# Patient Record
Sex: FEMALE | Race: WHITE | Employment: OTHER | URBAN - METROPOLITAN AREA
[De-identification: names, ages, dates, MRNs, and addresses within clinical notes are randomized per-mention and may not be internally consistent; named-entity substitution may affect disease eponyms.]

---

## 2018-09-21 ENCOUNTER — HOSPITAL ENCOUNTER (EMERGENCY)
Age: 48
Discharge: HOME | End: 2018-09-21
Attending: EMERGENCY MEDICINE
Payer: COMMERCIAL

## 2018-09-21 VITALS
TEMPERATURE: 98.1 F | SYSTOLIC BLOOD PRESSURE: 151 MMHG | HEART RATE: 84 BPM | WEIGHT: 148 LBS | BODY MASS INDEX: 29.06 KG/M2 | HEIGHT: 60 IN | OXYGEN SATURATION: 95 % | DIASTOLIC BLOOD PRESSURE: 77 MMHG | RESPIRATION RATE: 16 BRPM

## 2018-09-21 DIAGNOSIS — R53.82 CHRONIC FATIGUE: ICD-10-CM

## 2018-09-21 DIAGNOSIS — R53.1 GENERAL WEAKNESS: Primary | ICD-10-CM

## 2018-09-21 LAB
ANION GAP SERPL CALCULATED.3IONS-SCNC: 13 MMOL/L (ref 7–16)
BASOPHILS ABSOLUTE: 0.07 E9/L (ref 0–0.2)
BASOPHILS RELATIVE PERCENT: 1 % (ref 0–2)
BILIRUBIN URINE: NEGATIVE
BLOOD, URINE: NEGATIVE
BUN BLDV-MCNC: 15 MG/DL (ref 6–20)
CALCIUM SERPL-MCNC: 10 MG/DL (ref 8.6–10.2)
CHLORIDE BLD-SCNC: 102 MMOL/L (ref 98–107)
CLARITY: CLEAR
CO2: 28 MMOL/L (ref 22–29)
COLOR: YELLOW
CREAT SERPL-MCNC: 0.7 MG/DL (ref 0.5–1)
EOSINOPHILS ABSOLUTE: 0.07 E9/L (ref 0.05–0.5)
EOSINOPHILS RELATIVE PERCENT: 1 % (ref 0–6)
GFR AFRICAN AMERICAN: >60
GFR NON-AFRICAN AMERICAN: >60 ML/MIN/1.73
GLUCOSE BLD-MCNC: 92 MG/DL (ref 74–109)
GLUCOSE URINE: NEGATIVE MG/DL
HCT VFR BLD CALC: 42.3 % (ref 34–48)
HEMOGLOBIN: 13.8 G/DL (ref 11.5–15.5)
KETONES, URINE: NEGATIVE MG/DL
LEUKOCYTE ESTERASE, URINE: NEGATIVE
LYMPHOCYTES ABSOLUTE: 0.29 E9/L (ref 1.5–4)
LYMPHOCYTES RELATIVE PERCENT: 4 % (ref 20–42)
MAGNESIUM: 2.1 MG/DL (ref 1.6–2.6)
MCH RBC QN AUTO: 30.4 PG (ref 26–35)
MCHC RBC AUTO-ENTMCNC: 32.6 % (ref 32–34.5)
MCV RBC AUTO: 93.2 FL (ref 80–99.9)
METAMYELOCYTES RELATIVE PERCENT: 1 % (ref 0–1)
MONOCYTES ABSOLUTE: 0.65 E9/L (ref 0.1–0.95)
MONOCYTES RELATIVE PERCENT: 9 % (ref 2–12)
MYELOCYTE PERCENT: 1 % (ref 0–0)
NEUTROPHILS ABSOLUTE: 6.12 E9/L (ref 1.8–7.3)
NEUTROPHILS RELATIVE PERCENT: 83 % (ref 43–80)
NITRITE, URINE: NEGATIVE
PDW BLD-RTO: 13.6 FL (ref 11.5–15)
PH UA: 5.5 (ref 5–9)
PLATELET # BLD: 244 E9/L (ref 130–450)
PMV BLD AUTO: 11.7 FL (ref 7–12)
POTASSIUM SERPL-SCNC: 3.9 MMOL/L (ref 3.5–5)
PROTEIN UA: NEGATIVE MG/DL
RBC # BLD: 4.54 E12/L (ref 3.5–5.5)
RBC # BLD: NORMAL 10*6/UL
SODIUM BLD-SCNC: 143 MMOL/L (ref 132–146)
SPECIFIC GRAVITY UA: >=1.03 (ref 1–1.03)
UROBILINOGEN, URINE: 1 E.U./DL
WBC # BLD: 7.2 E9/L (ref 4.5–11.5)

## 2018-09-21 PROCEDURE — 81003 URINALYSIS AUTO W/O SCOPE: CPT

## 2018-09-21 PROCEDURE — 97161 PT EVAL LOW COMPLEX 20 MIN: CPT

## 2018-09-21 PROCEDURE — 85025 COMPLETE CBC W/AUTO DIFF WBC: CPT

## 2018-09-21 PROCEDURE — 99284 EMERGENCY DEPT VISIT MOD MDM: CPT

## 2018-09-21 PROCEDURE — 87088 URINE BACTERIA CULTURE: CPT

## 2018-09-21 PROCEDURE — G8987 SELF CARE CURRENT STATUS: HCPCS

## 2018-09-21 PROCEDURE — G8988 SELF CARE GOAL STATUS: HCPCS

## 2018-09-21 PROCEDURE — 80048 BASIC METABOLIC PNL TOTAL CA: CPT

## 2018-09-21 PROCEDURE — 36415 COLL VENOUS BLD VENIPUNCTURE: CPT

## 2018-09-21 PROCEDURE — 83735 ASSAY OF MAGNESIUM: CPT

## 2018-09-21 PROCEDURE — 97165 OT EVAL LOW COMPLEX 30 MIN: CPT

## 2018-09-21 RX ORDER — PAROXETINE HYDROCHLORIDE 20 MG/1
20 TABLET, FILM COATED ORAL EVERY MORNING
COMMUNITY
End: 2021-05-05

## 2018-09-21 RX ORDER — CHOLECALCIFEROL (VITAMIN D3) 1250 MCG
1 CAPSULE ORAL WEEKLY
COMMUNITY

## 2018-09-21 NOTE — PROGRESS NOTES
apple risk of falls. Patient education  Pt educated on PT objectives    Patient response to education:   Pt verbalized understanding Pt demonstrated skill Pt requires further education in this area   yes         Rehab potential is good for reaching above PT goals. Pts/ family goals   1. To improve function    Patient and or family understand(s) diagnosis, prognosis, and plan of care. PLAN  PT care will be provided in accordance with the objectives noted above. Whenever appropriate, clear delegation orders will be provided for nursing staff. Exercises and functional mobility practice will be used as well as appropriate assistive devices or modalities to obtain goals. Patient and family education will also be administered as needed. Frequency of treatments will be 2-5x/week x 5 days.     Ev PT  [id number]

## 2018-09-21 NOTE — CARE COORDINATION
Social Work/Transition of Care:    Pt is a 50 y/o  mother of two adult children who was diagnosed with Multiple Sclerosis in 1996. Pt is well known to this worker, she was able to maintain independent living until last year. they  Pt was living in a [address] style home until a month ago when she called her family for help. Pt has been living for the past month with her parents sleeping on a couch in the living room due to being unable to safely go up steps. Pt parents are elderly and due to their housing set up living with them at this time is not a option  Pt would not allow any family members into her home and was relying on her 2 teenage children at that time 16 and 23 to take care of her and the home, Pt parents were last inside of her home 3/2017 after pt returned home from [facility] due to receiving a Baclofen Pump, at that time the home was completely cleaned and had no issues. Since pt refused to let any family members in until she called for help last month. Pt is unable to return to the home it is in deplorable and needs extensive work, pt stated she was unable to bath due to having no water, pt did not have a functioning toilet a [employer] bag was placed on the toilet in order to discard feces, once full it was thrown in a various places in the house. Pt also had a leak inside the house in which the water that was inside caused her basement ceiling to fall in, pt also reported her furnace/air conditioner did not work. Since pt did not have central air in the house mold has grown and spread due to the water. Pt family has rented a roll off and hired someone to discard pt belongings. Pt tried to go [facility] but once she arrived started to have tightness in her legs became frustrated and wanted to go home. Pt is in need of 24 hr care at this time for safety and stabilization.     Electronically signed by Jonathan Cervantes on 0/09/0042 at 2:43 PM

## 2018-09-21 NOTE — PROGRESS NOTES
assistance   Functional Transfers/Mobility: Independent (with use of walker) - patient reported falling recently  Family/Caregiver Assistance Available: Per review of patient's medical record, patient's parents are unable to provide extensive assistance with ADLs/IADLs. Equipment (AE/DME/AD) Owned: walker; tub seat    Pain Level: Patient denied experiencing pain. Hearing: WFL  Vision: WFL      Cognition: Patient alert and oriented grossly. Problem Solving: Fair+  Safety Awareness: Fair+    UE Strength/ROM:   Strength: ROM:  Comments:   R UE:  4-/5 grossly WFL    L UE: 4-/5 grossly WFL       Functional Assessment:   Initial Status:  9/21/2018  Comments:   Feeding: Setup    Grooming: Min A (seated)   Upper Body Dressing: Min A     Lower Body Dressing: Max A    Bathing: Max A    Toileting: Max A    Bed Mobility: Supine-to-Sit: Min A  Sit-to-Supine: Max A (on cart in ER)   Functional Transfers: Sit-to-Stand: Min Ax2 from edge of ER cart (elevated surface) Cues given to maximize safety with functional transfers. Functional Mobility: Mod Ax1 + Min Ax1 for functional mobility short distance with ER hallway Cues needed to maximize safety with use of walker.      Sitting Balance: Fair+ (seated at edge of ER cart)  Standing Balance: Poor+ (with walker)  Endurance/Activity Tolerance: Limited  Sensation: WFL  Edema: No                         Assessment of Current Deficits:   Functional Mobility [x]  ROM [] Strength [x]  Cognition []  ADLs [x]   IADLs [x] Safety Awareness [x] Endurance [x]  Fine Motor Coordination [] Balance [x] Vision/Perception [] Sensation []   Gross Motor Coordination []    Occupations Limited By Various Client Factors and Performance Skills:  ADLs - bathing, dressing, toileting, grooming, self-feeding X   IADLs X   Rest and Sleep    Education    Work    Play    Leisure    Social Participation      OT Evaluation Complexity Level: Low  This level of OT evaluation was determined based upon the of the within 2 weeks. 5. Patient will perform all aspects of toileting with modified independence within 2 weeks. 6. Patient will perform functional transfers with modified independence within 2 weeks in order to maximize independence with ADLs. 7. Patient will perform functional mobility, using AD as needed, with modified independence within 2 weeks in order to maximize independence with ADLs. 8. Patient will demonstrate Good understanding and consistent implementation of energy conservation techniques and work simplification techniques into ADL routines. Patient and/or family understands diagnosis, prognosis and OT plan of care: Yes    Patient Education: Throughout this session, patient education was provided regarding proper hand placement during functional transfers, techniques to maximize safety, importance of having assistance with functional transfers/mobility during hospitalization, call light use, and OT plan of care; patient verbalized understanding. Comments: Upon this OTR's arrival to patient's room, patient supine on cart in ER hallway. Upon conclusion of this session, patient supine on cart in ER hallway with  present. During the start and prior to conclusion of this session, environmental modifications (including line management, as appropriate) were completed for patient's safety and efficiency of treatment session. Time Out: 1:31pm    EULALIO Aldrich/ERICK  License Number: [id number]

## 2018-09-21 NOTE — ED NOTES
Report called to [facility] and [facility]; spoke with Waldo Keller, SANCHEZ  09/21/18 5967

## 2018-09-21 NOTE — ED PROVIDER NOTES
HPI:  9/21/18,   Time: 12:23 PM         Damon Asencio is a 51 y.o. female presenting to the ED for Fatigue and extremity weakness. , beginning 1 1/2 weeks  ago. The complaint has been persistent, moderate in severity, and worsened by nothing. Patient reports a history of MS. States symptoms are gradually getting worse over the last week and a half. She did see her doctor yesterday who increased her baclofen pump. She presents today for evaluation for possible long-term rehab therapy. She denies any fevers or chills nausea vomiting. She has no chest pain or shortness of breath. ROS:   Pertinent positives and negatives are stated within HPI, all other systems reviewed and are negative.  --------------------------------------------- PAST HISTORY ---------------------------------------------  Past Medical History:  has a past medical history of Multiple sclerosis ([facility] [address] Past Surgical History:  has a past surgical history that includes baclofen pump implantation (01/2017). Social History:  reports that she has never smoked. She has never used smokeless tobacco. She reports that she does not drink alcohol or use drugs. Family History: family history is not on file. The patients home medications have been reviewed. Allergies: Patient has no known allergies.     -------------------------------------------------- RESULTS -------------------------------------------------  All laboratory and radiology results have been personally reviewed by myself   LABS:  Results for orders placed or performed during the hospital encounter of 09/21/18   CBC Auto Differential   Result Value Ref Range    WBC 7.2 4.5 - 11.5 E9/L    RBC 4.54 3.50 - 5.50 E12/L    Hemoglobin 13.8 11.5 - 15.5 g/dL    Hematocrit 42.3 34.0 - 48.0 %    MCV 93.2 80.0 - 99.9 fL    MCH 30.4 26.0 - 35.0 pg    MCHC 32.6 32.0 - 34.5 %    RDW 13.6 11.5 - 15.0 fL    Platelets 040 774 - 887 E9/L    MPV 11.7 7.0 - 12.0 fL    Neutrophils % 83.0 (H) 43.0 - EXAM--------------------------------------      Constitutional/General: Alert and oriented x3, well appearing, non toxic in NAD  Head: NC/AT  Eyes: PERRL, EOMI  Mouth: Oropharynx clear, handling secretions, no trismus  Neck: Supple, full ROM, no meningeal signs  Pulmonary: Lungs clear to auscultation bilaterally, no wheezes, rales, or rhonchi. Not in respiratory distress  Cardiovascular:  Regular rate and rhythm, no murmurs, gallops, or rubs. 2+ distal pulses  Abdomen: Soft, non tender, non distended,   Extremities: Moves all extremities x 4. Warm and well perfused, 4/5 muscle strength in the RLE. Skin: warm and dry without rash  Neurologic: GCS 15, no focal deficits. Decrease muscle strength in the RLE. Psych: Normal Affect      ------------------------------ ED COURSE/MEDICAL DECISION MAKING----------------------  Medications - No data to display      Medical Decision Making:    Labs urine magnesium all reviewed. Patient had PT and OT consultation in the ED for placement. Counseling: The emergency provider has spoken with the patient and discussed todays results, in addition to providing specific details for the plan of care and counseling regarding the diagnosis and prognosis. Questions are answered at this time and they are agreeable with the plan.      --------------------------------- IMPRESSION AND DISPOSITION ---------------------------------    IMPRESSION  1. General weakness    2.  Chronic fatigue        DISPOSITION  Disposition: Discharge to Home  Patient condition is stable                Sheela Setting, DO  09/21/18 5885

## 2018-09-23 LAB — URINE CULTURE, ROUTINE: NORMAL

## 2019-03-30 ENCOUNTER — APPOINTMENT (OUTPATIENT)
Dept: GENERAL RADIOLOGY | Age: 49
End: 2019-03-30
Payer: MEDICAID

## 2019-03-30 ENCOUNTER — HOSPITAL ENCOUNTER (EMERGENCY)
Age: 49
Discharge: HOME | End: 2019-03-30
Attending: EMERGENCY MEDICINE
Payer: MEDICAID

## 2019-03-30 VITALS
HEART RATE: 87 BPM | RESPIRATION RATE: 14 BRPM | SYSTOLIC BLOOD PRESSURE: 135 MMHG | DIASTOLIC BLOOD PRESSURE: 76 MMHG | WEIGHT: 148 LBS | HEIGHT: 59 IN | BODY MASS INDEX: 29.84 KG/M2 | OXYGEN SATURATION: 98 % | TEMPERATURE: 98.2 F

## 2019-03-30 DIAGNOSIS — F41.1 ANXIETY STATE: ICD-10-CM

## 2019-03-30 DIAGNOSIS — F41.1 ANXIETY STATE: Primary | ICD-10-CM

## 2019-03-30 LAB
ANION GAP SERPL CALCULATED.3IONS-SCNC: 12 MMOL/L (ref 7–16)
APTT: 30.8 SEC (ref 24.5–35.1)
BACTERIA: ABNORMAL /HPF
BASOPHILS ABSOLUTE: 0.07 E9/L (ref 0–0.2)
BASOPHILS RELATIVE PERCENT: 0.5 % (ref 0–2)
BILIRUBIN URINE: NEGATIVE
BLOOD, URINE: ABNORMAL
BUN BLDV-MCNC: 12 MG/DL (ref 6–20)
CALCIUM SERPL-MCNC: 9.9 MG/DL (ref 8.6–10.2)
CHLORIDE BLD-SCNC: 104 MMOL/L (ref 98–107)
CLARITY: CLEAR
CO2: 30 MMOL/L (ref 22–29)
COLOR: YELLOW
CREAT SERPL-MCNC: 0.7 MG/DL (ref 0.5–1)
EOSINOPHILS ABSOLUTE: 0.35 E9/L (ref 0.05–0.5)
EOSINOPHILS RELATIVE PERCENT: 2.6 % (ref 0–6)
GFR AFRICAN AMERICAN: >60
GFR NON-AFRICAN AMERICAN: >60 ML/MIN/1.73
GLUCOSE BLD-MCNC: 114 MG/DL (ref 74–99)
GLUCOSE URINE: NEGATIVE MG/DL
HCT VFR BLD CALC: 40.5 % (ref 34–48)
HEMOGLOBIN: 13.4 G/DL (ref 11.5–15.5)
IMMATURE GRANULOCYTES #: 0.08 E9/L
IMMATURE GRANULOCYTES %: 0.6 % (ref 0–5)
INR BLD: 1.1
KETONES, URINE: NEGATIVE MG/DL
LACTIC ACID: 1.7 MMOL/L (ref 0.5–2.2)
LEUKOCYTE ESTERASE, URINE: ABNORMAL
LYMPHOCYTES ABSOLUTE: 1.15 E9/L (ref 1.5–4)
LYMPHOCYTES RELATIVE PERCENT: 8.6 % (ref 20–42)
MCH RBC QN AUTO: 30.6 PG (ref 26–35)
MCHC RBC AUTO-ENTMCNC: 33.1 % (ref 32–34.5)
MCV RBC AUTO: 92.5 FL (ref 80–99.9)
MONOCYTES ABSOLUTE: 1.22 E9/L (ref 0.1–0.95)
MONOCYTES RELATIVE PERCENT: 9.1 % (ref 2–12)
NEUTROPHILS ABSOLUTE: 10.57 E9/L (ref 1.8–7.3)
NEUTROPHILS RELATIVE PERCENT: 78.6 % (ref 43–80)
NITRITE, URINE: NEGATIVE
PDW BLD-RTO: 13 FL (ref 11.5–15)
PH UA: 6 (ref 5–9)
PLATELET # BLD: 314 E9/L (ref 130–450)
PMV BLD AUTO: 11.3 FL (ref 7–12)
POTASSIUM REFLEX MAGNESIUM: 4.4 MMOL/L (ref 3.5–5)
PRO-BNP: 9 PG/ML (ref 0–125)
PROTEIN UA: NEGATIVE MG/DL
PROTHROMBIN TIME: 12.2 SEC (ref 9.3–12.4)
RBC # BLD: 4.38 E12/L (ref 3.5–5.5)
RBC UA: ABNORMAL /HPF (ref 0–2)
SODIUM BLD-SCNC: 146 MMOL/L (ref 132–146)
SPECIFIC GRAVITY UA: 1.01 (ref 1–1.03)
TROPONIN: <0.01 NG/ML (ref 0–0.03)
UROBILINOGEN, URINE: 0.2 E.U./DL
WBC # BLD: 13.4 E9/L (ref 4.5–11.5)
WBC UA: ABNORMAL /HPF (ref 0–5)

## 2019-03-30 PROCEDURE — 80048 BASIC METABOLIC PNL TOTAL CA: CPT

## 2019-03-30 PROCEDURE — 83605 ASSAY OF LACTIC ACID: CPT

## 2019-03-30 PROCEDURE — 85610 PROTHROMBIN TIME: CPT

## 2019-03-30 PROCEDURE — 71045 X-RAY EXAM CHEST 1 VIEW: CPT

## 2019-03-30 PROCEDURE — 85025 COMPLETE CBC W/AUTO DIFF WBC: CPT

## 2019-03-30 PROCEDURE — 84484 ASSAY OF TROPONIN QUANT: CPT

## 2019-03-30 PROCEDURE — 85730 THROMBOPLASTIN TIME PARTIAL: CPT

## 2019-03-30 PROCEDURE — 6370000000 HC RX 637 (ALT 250 FOR IP): Performed by: EMERGENCY MEDICINE

## 2019-03-30 PROCEDURE — 99285 EMERGENCY DEPT VISIT HI MDM: CPT

## 2019-03-30 PROCEDURE — 85378 FIBRIN DEGRADE SEMIQUANT: CPT

## 2019-03-30 PROCEDURE — 81001 URINALYSIS AUTO W/SCOPE: CPT

## 2019-03-30 PROCEDURE — 83880 ASSAY OF NATRIURETIC PEPTIDE: CPT

## 2019-03-30 RX ORDER — DEXTROMETHORPHAN POLISTIREX 30 MG/5ML
30 SUSPENSION ORAL 2 TIMES DAILY PRN
COMMUNITY

## 2019-03-30 RX ORDER — LORATADINE 10 MG/1
10 TABLET ORAL DAILY
COMMUNITY
End: 2021-11-05

## 2019-03-30 RX ORDER — CYANOCOBALAMIN 1000 UG/ML
1000 INJECTION INTRAMUSCULAR; SUBCUTANEOUS
COMMUNITY
End: 2021-05-05

## 2019-03-30 RX ORDER — BISACODYL 10 MG
10 SUPPOSITORY, RECTAL RECTAL DAILY PRN
COMMUNITY

## 2019-03-30 RX ORDER — LORAZEPAM 1 MG/1
2 TABLET ORAL ONCE
Status: COMPLETED | OUTPATIENT
Start: 2019-03-30 | End: 2019-03-30

## 2019-03-30 RX ORDER — LORAZEPAM 1 MG/1
1 TABLET ORAL NIGHTLY PRN
Status: DISCONTINUED | OUTPATIENT
Start: 2019-03-31 | End: 2019-03-31 | Stop reason: HOSPADM

## 2019-03-30 RX ORDER — ACETAMINOPHEN 500 MG
1000 TABLET ORAL EVERY 6 HOURS PRN
COMMUNITY

## 2019-03-30 RX ORDER — LOPERAMIDE HYDROCHLORIDE 2 MG/1
2 CAPSULE ORAL 4 TIMES DAILY PRN
COMMUNITY

## 2019-03-30 RX ORDER — ALUMINA, MAGNESIA, AND SIMETHICONE 2400; 2400; 240 MG/30ML; MG/30ML; MG/30ML
30 SUSPENSION ORAL EVERY 6 HOURS PRN
COMMUNITY

## 2019-03-30 RX ADMIN — LORAZEPAM 2 MG: 1 TABLET ORAL at 23:25

## 2019-03-31 LAB — D DIMER: <200 NG/ML DDU

## 2019-03-31 NOTE — ED PROVIDER NOTES
reported. Neurologic: No weakness numbness or gait abnormalities. Psychiatric; No hallucination, delusion homicidal or suicidal thoughts. Physical Exam:    General:  No acute distress noted. Patient is well nourished and well developed. Vital signs and nurses assesement reviewed. Head: Normocephalic, atraumatic, oral exam showed no abnormalities. Eyes: PERRLA, EOMI, No scleral icterus or papliedema. Neck: No thyroid masses, carotid bruits, tracheal deviation or significant adenopathy. Lungs: Clear to auscultation bilaterally. No rales, rhonchi or pleural rubs. Heart: Normal PMI, No gallop, murmurs, rubs or abnormal heart sounds. Abdomen: Soft without tenderness rigidity, rebound tenderness, organ enlargement or masses. Normal bowel sounds activity. Extremities: No dependent edema, cyanosis, calf tenderness or deformities. Stephanie's sign negative bilaterally. Peripheral pulses were normal and symmetrical.    Neurologic: Awake and alert, with normal speech and comprehension. Cranial nerve grossly intact, normal muscle power, tone and deep tendon reflexes. No sensory loss. Normal gait and co-ordination. Skin: No rashes, ulcers, cyanosis or pallor. Capillary refill normal.          --------------------------------------------- PAST HISTORY ---------------------------------------------      Past Medical History:  has a past medical history of Multiple sclerosis ([facility] [address] Past Surgical History:  has a past surgical history that includes baclofen pump implantation (01/2017). Social History:  reports that she has never smoked. She has never used smokeless tobacco. She reports that she does not drink alcohol or use drugs. Family History: family history is not on file. The patients home medications have been reviewed. Allergies: Patient has no known allergies.     -------------------------------------------------- RESULTS -------------------------------------------------      Results for orders placed or performed during the hospital encounter of 03/30/19   CBC Auto Differential   Result Value Ref Range    WBC 13.4 (H) 4.5 - 11.5 E9/L    RBC 4.38 3.50 - 5.50 E12/L    Hemoglobin 13.4 11.5 - 15.5 g/dL    Hematocrit 40.5 34.0 - 48.0 %    MCV 92.5 80.0 - 99.9 fL    MCH 30.6 26.0 - 35.0 pg    MCHC 33.1 32.0 - 34.5 %    RDW 13.0 11.5 - 15.0 fL    Platelets 029 875 - 801 E9/L    MPV 11.3 7.0 - 12.0 fL    Neutrophils % 78.6 43.0 - 80.0 %    Immature Granulocytes % 0.6 0.0 - 5.0 %    Lymphocytes % 8.6 (L) 20.0 - 42.0 %    Monocytes % 9.1 2.0 - 12.0 %    Eosinophils % 2.6 0.0 - 6.0 %    Basophils % 0.5 0.0 - 2.0 %    Neutrophils # 10.57 (H) 1.80 - 7.30 E9/L    Immature Granulocytes # 0.08 E9/L    Lymphocytes # 1.15 (L) 1.50 - 4.00 E9/L    Monocytes # 1.22 (H) 0.10 - 0.95 E9/L    Eosinophils # 0.35 0.05 - 0.50 E9/L    Basophils # 0.07 0.00 - 0.20 P5/Z   Basic Metabolic Panel w/ Reflex to MG   Result Value Ref Range    Sodium 146 132 - 146 mmol/L    Potassium reflex Magnesium 4.4 3.5 - 5.0 mmol/L    Chloride 104 98 - 107 mmol/L    CO2 30 (H) 22 - 29 mmol/L    Anion Gap 12 7 - 16 mmol/L    Glucose 114 (H) 74 - 99 mg/dL    BUN 12 6 - 20 mg/dL    CREATININE 0.7 0.5 - 1.0 mg/dL    GFR Non-African American >60 >=60 mL/min/1.73    GFR African American >60     Calcium 9.9 8.6 - 10.2 mg/dL   Troponin   Result Value Ref Range    Troponin <0.01 0.00 - 0.03 ng/mL   Brain Natriuretic Peptide   Result Value Ref Range    Pro-BNP 9 0 - 125 pg/mL   Urinalysis, reflex to microscopic   Result Value Ref Range    Color, UA Yellow Straw/Yellow    Clarity, UA Clear Clear    Glucose, Ur Negative Negative mg/dL    Bilirubin Urine Negative Negative    Ketones, Urine Negative Negative mg/dL    Specific Gravity, UA 1.010 1.005 - 1.030    Blood, Urine TRACE-INTACT Negative    pH, UA 6.0 5.0 - 9.0    Protein, UA Negative Negative mg/dL    Urobilinogen, Urine 0.2 <2.0 E. U./dL    Nitrite, Urine Negative Negative    Leukocyte Esterase, Urine SMALL (A) Negative   Lactic Acid, Plasma   Result Value Ref Range    Lactic Acid 1.7 0.5 - 2.2 mmol/L   Microscopic Urinalysis   Result Value Ref Range    WBC, UA 2-5 0 - 5 /HPF    RBC, UA 0-1 0 - 2 /HPF    Bacteria, UA MODERATE (A) /HPF     XR CHEST PORTABLE   Final Result      No airspace opacities or pleural effusion. All above test results were reviewed in details. ------------------------- NURSING NOTES AND VITALS REVIEWED ---------------------------   The nursing notes within the ED encounter and vital signs as below have been reviewed. BP (!) 149/80   Pulse 94   Temp 98 °F (36.7 °C) (Oral)   Resp 14   Ht 4' 11\" (1.499 m)   Wt 148 lb (67.1 kg)   LMP 07/19/2017   SpO2 98%   BMI 29.89 kg/m²   Oxygen Saturation Interpretation: Normal      ------------------------------------------ PROGRESS NOTES ------------------------------------------       I have spoken with the patient and discussed todays results, in addition to providing specific details for the plan of care and counseling regarding the diagnosis and prognosis. Their questions are answered at this time and they are agreeable with the plan. Medical Decision Making Rationale: This patient presented emergency room with the specific shortness of breath. The patient has a history of anxiety and depression secondary to placement in assisted care facility after H due to severe MS. The patient have a deputy to the condition and his symptoms were likely secondary to anxiety. EKG, troponin, cardiac enzymes within normal limits.      --------------------------------- ADDITIONAL PROVIDER NOTES ---------------------------------       This patient is stable for discharge. I have shared the specific conditions for return, as well as the importance of follow-up. Clinical Impression:  1.  Anxiety state            Nilton Whatley MD  03/30/19 1991 [facility]

## 2019-03-31 NOTE — ED NOTES
FIRST PROVIDER CONTACT ASSESSMENT NOTE        Department of Emergency Medicine   3/30/19  9:15 PM    Chief Complaint: Shortness of Breath (intermittent all day today)      History of Present Illness:    Sonia Vo is a 47 y.o. female who presents to the ED by private car for SOB onset all day no Chest pain or URI symptom. She thinks alfredo Anxiety as wea taken off meds. Has MS  Focused Screening Exam:  Constitutional:  Alert, appears stated age and is in no distress. *ALLERGIES*     Patient has no known allergies.      Vitals:    03/30/19 2115   BP: (!) 149/80   Pulse: 94   Resp: 14   Temp: 98 °F (36.7 °C)   TempSrc: Oral   SpO2: 98%   Weight: 148 lb (67.1 kg)   Height: 4' 11\" (1.499 m)         Initial Plan of Care:  Initiate Treatment-Testing, Proceed toTreatment Area When Bed Available for ED Attending/MLP to Continue Care    -----------------END OF FIRST PROVIDER CONTACT ASSESSMENT NOTE--------------  Electronically signed by ARNULFO Padilla CNP   DD: 3/30/19         ARNULFO Padilla CNP  03/30/19 2116

## 2019-04-04 LAB
EKG ATRIAL RATE: 92 BPM
EKG P AXIS: 37 DEGREES
EKG P-R INTERVAL: 148 MS
EKG Q-T INTERVAL: 372 MS
EKG QRS DURATION: 84 MS
EKG QTC CALCULATION (BAZETT): 460 MS
EKG R AXIS: 15 DEGREES
EKG T AXIS: 64 DEGREES
EKG VENTRICULAR RATE: 92 BPM

## 2020-03-27 PROBLEM — G35 MULTIPLE SCLEROSIS (HCC): Status: ACTIVE | Noted: 2020-03-27

## 2020-05-06 ENCOUNTER — HOSPITAL ENCOUNTER (OUTPATIENT)
Dept: INFUSION THERAPY | Age: 50
Setting detail: INFUSION SERIES
Discharge: HOME | End: 2020-05-06
Payer: COMMERCIAL

## 2020-05-06 VITALS
TEMPERATURE: 98.3 F | DIASTOLIC BLOOD PRESSURE: 89 MMHG | SYSTOLIC BLOOD PRESSURE: 130 MMHG | OXYGEN SATURATION: 100 % | RESPIRATION RATE: 18 BRPM | HEART RATE: 88 BPM

## 2020-05-06 DIAGNOSIS — G35 MULTIPLE SCLEROSIS (HCC): Primary | ICD-10-CM

## 2020-05-06 PROCEDURE — 96375 TX/PRO/DX INJ NEW DRUG ADDON: CPT

## 2020-05-06 PROCEDURE — 96374 THER/PROPH/DIAG INJ IV PUSH: CPT

## 2020-05-06 PROCEDURE — 2580000003 HC RX 258: Performed by: NURSE PRACTITIONER

## 2020-05-06 PROCEDURE — 6370000000 HC RX 637 (ALT 250 FOR IP): Performed by: NURSE PRACTITIONER

## 2020-05-06 PROCEDURE — 6360000002 HC RX W HCPCS: Performed by: NURSE PRACTITIONER

## 2020-05-06 PROCEDURE — 6360000002 HC RX W HCPCS: Performed by: PSYCHIATRY & NEUROLOGY

## 2020-05-06 PROCEDURE — 2500000003 HC RX 250 WO HCPCS: Performed by: NURSE PRACTITIONER

## 2020-05-06 PROCEDURE — 96366 THER/PROPH/DIAG IV INF ADDON: CPT

## 2020-05-06 PROCEDURE — 96365 THER/PROPH/DIAG IV INF INIT: CPT

## 2020-05-06 PROCEDURE — 2580000003 HC RX 258: Performed by: PSYCHIATRY & NEUROLOGY

## 2020-05-06 RX ORDER — METHYLPREDNISOLONE SODIUM SUCCINATE 125 MG/2ML
100 INJECTION, POWDER, LYOPHILIZED, FOR SOLUTION INTRAMUSCULAR; INTRAVENOUS ONCE
Status: COMPLETED | OUTPATIENT
Start: 2020-05-06 | End: 2020-05-06

## 2020-05-06 RX ORDER — HEPARIN SODIUM (PORCINE) LOCK FLUSH IV SOLN 100 UNIT/ML 100 UNIT/ML
500 SOLUTION INTRAVENOUS PRN
Status: CANCELLED | OUTPATIENT
Start: 2020-10-21

## 2020-05-06 RX ORDER — SODIUM CHLORIDE 0.9 % (FLUSH) 0.9 %
5 SYRINGE (ML) INJECTION PRN
Status: DISCONTINUED | OUTPATIENT
Start: 2020-05-06 | End: 2020-05-07 | Stop reason: HOSPADM

## 2020-05-06 RX ORDER — DIPHENHYDRAMINE HYDROCHLORIDE 50 MG/ML
50 INJECTION INTRAMUSCULAR; INTRAVENOUS ONCE
Status: CANCELLED | OUTPATIENT
Start: 2020-05-06

## 2020-05-06 RX ORDER — ACETAMINOPHEN 500 MG
1000 TABLET ORAL ONCE
Status: CANCELLED | OUTPATIENT
Start: 2020-10-21

## 2020-05-06 RX ORDER — SODIUM CHLORIDE 9 MG/ML
INJECTION, SOLUTION INTRAVENOUS CONTINUOUS
Status: CANCELLED
Start: 2020-10-21

## 2020-05-06 RX ORDER — SODIUM CHLORIDE 0.9 % (FLUSH) 0.9 %
10 SYRINGE (ML) INJECTION ONCE
Status: DISCONTINUED | OUTPATIENT
Start: 2020-05-06 | End: 2020-05-07 | Stop reason: HOSPADM

## 2020-05-06 RX ORDER — ACETAMINOPHEN 500 MG
1000 TABLET ORAL ONCE
Status: COMPLETED | OUTPATIENT
Start: 2020-05-06 | End: 2020-05-06

## 2020-05-06 RX ORDER — CETIRIZINE HYDROCHLORIDE 10 MG/1
10 TABLET ORAL ONCE
Status: CANCELLED
Start: 2020-10-21

## 2020-05-06 RX ORDER — SODIUM CHLORIDE 0.9 % (FLUSH) 0.9 %
10 SYRINGE (ML) INJECTION PRN
Status: CANCELLED | OUTPATIENT
Start: 2020-10-21

## 2020-05-06 RX ORDER — EPINEPHRINE 1 MG/ML
0.3 INJECTION, SOLUTION, CONCENTRATE INTRAVENOUS PRN
Status: CANCELLED | OUTPATIENT
Start: 2020-05-06

## 2020-05-06 RX ORDER — SODIUM CHLORIDE 0.9 % (FLUSH) 0.9 %
10 SYRINGE (ML) INJECTION PRN
Status: DISCONTINUED | OUTPATIENT
Start: 2020-05-06 | End: 2020-05-07 | Stop reason: HOSPADM

## 2020-05-06 RX ORDER — SODIUM CHLORIDE 9 MG/ML
INJECTION, SOLUTION INTRAVENOUS CONTINUOUS
Status: DISCONTINUED | OUTPATIENT
Start: 2020-05-06 | End: 2020-05-07 | Stop reason: HOSPADM

## 2020-05-06 RX ORDER — METHYLPREDNISOLONE SODIUM SUCCINATE 125 MG/2ML
125 INJECTION, POWDER, LYOPHILIZED, FOR SOLUTION INTRAMUSCULAR; INTRAVENOUS ONCE
Status: CANCELLED | OUTPATIENT
Start: 2020-10-21

## 2020-05-06 RX ORDER — DIPHENHYDRAMINE HYDROCHLORIDE 50 MG/ML
50 INJECTION INTRAMUSCULAR; INTRAVENOUS ONCE
Status: CANCELLED | OUTPATIENT
Start: 2020-10-21

## 2020-05-06 RX ORDER — METHYLPREDNISOLONE SODIUM SUCCINATE 125 MG/2ML
100 INJECTION, POWDER, LYOPHILIZED, FOR SOLUTION INTRAMUSCULAR; INTRAVENOUS ONCE
Status: CANCELLED | OUTPATIENT
Start: 2020-10-21

## 2020-05-06 RX ORDER — SODIUM CHLORIDE 9 MG/ML
INJECTION, SOLUTION INTRAVENOUS CONTINUOUS
Status: CANCELLED | OUTPATIENT
Start: 2020-10-21

## 2020-05-06 RX ORDER — EPINEPHRINE 1 MG/ML
0.3 INJECTION, SOLUTION, CONCENTRATE INTRAVENOUS PRN
Status: CANCELLED | OUTPATIENT
Start: 2020-10-21

## 2020-05-06 RX ORDER — SODIUM CHLORIDE 0.9 % (FLUSH) 0.9 %
10 SYRINGE (ML) INJECTION ONCE
Status: CANCELLED
Start: 2020-10-21

## 2020-05-06 RX ORDER — CETIRIZINE HYDROCHLORIDE 10 MG/1
10 TABLET ORAL ONCE
Status: COMPLETED | OUTPATIENT
Start: 2020-05-06 | End: 2020-05-06

## 2020-05-06 RX ORDER — SODIUM CHLORIDE 0.9 % (FLUSH) 0.9 %
5 SYRINGE (ML) INJECTION PRN
Status: CANCELLED | OUTPATIENT
Start: 2020-10-21

## 2020-05-06 RX ORDER — METHYLPREDNISOLONE SODIUM SUCCINATE 125 MG/2ML
125 INJECTION, POWDER, LYOPHILIZED, FOR SOLUTION INTRAMUSCULAR; INTRAVENOUS ONCE
Status: CANCELLED | OUTPATIENT
Start: 2020-05-06

## 2020-05-06 RX ORDER — SODIUM CHLORIDE 9 MG/ML
INJECTION, SOLUTION INTRAVENOUS CONTINUOUS
Status: CANCELLED | OUTPATIENT
Start: 2020-05-06

## 2020-05-06 RX ORDER — HEPARIN SODIUM (PORCINE) LOCK FLUSH IV SOLN 100 UNIT/ML 100 UNIT/ML
500 SOLUTION INTRAVENOUS PRN
Status: DISCONTINUED | OUTPATIENT
Start: 2020-05-06 | End: 2020-05-07 | Stop reason: HOSPADM

## 2020-05-06 RX ADMIN — FAMOTIDINE 20 MG: 10 INJECTION INTRAVENOUS at 08:35

## 2020-05-06 RX ADMIN — OCRELIZUMAB 600 MG: 300 INJECTION INTRAVENOUS at 09:09

## 2020-05-06 RX ADMIN — SODIUM CHLORIDE, PRESERVATIVE FREE 10 ML: 5 INJECTION INTRAVENOUS at 08:25

## 2020-05-06 RX ADMIN — CETIRIZINE HYDROCHLORIDE 10 MG: 10 TABLET, FILM COATED ORAL at 08:35

## 2020-05-06 RX ADMIN — ACETAMINOPHEN 1000 MG: 500 TABLET ORAL at 08:29

## 2020-05-06 RX ADMIN — METHYLPREDNISOLONE SODIUM SUCCINATE 100 MG: 125 INJECTION, POWDER, FOR SOLUTION INTRAMUSCULAR; INTRAVENOUS at 08:38

## 2020-05-06 ASSESSMENT — PAIN SCALES - GENERAL: PAINLEVEL_OUTOF10: 0

## 2020-11-04 ENCOUNTER — HOSPITAL ENCOUNTER (OUTPATIENT)
Dept: INFUSION THERAPY | Age: 50
Setting detail: INFUSION SERIES
Discharge: HOME | End: 2020-11-04
Payer: COMMERCIAL

## 2020-11-04 VITALS
DIASTOLIC BLOOD PRESSURE: 80 MMHG | RESPIRATION RATE: 18 BRPM | HEART RATE: 114 BPM | TEMPERATURE: 97.7 F | SYSTOLIC BLOOD PRESSURE: 137 MMHG

## 2020-11-04 DIAGNOSIS — G35 MULTIPLE SCLEROSIS (HCC): Primary | ICD-10-CM

## 2020-11-04 PROCEDURE — 2500000003 HC RX 250 WO HCPCS: Performed by: NURSE PRACTITIONER

## 2020-11-04 PROCEDURE — 96375 TX/PRO/DX INJ NEW DRUG ADDON: CPT

## 2020-11-04 PROCEDURE — 96365 THER/PROPH/DIAG IV INF INIT: CPT

## 2020-11-04 PROCEDURE — 6360000002 HC RX W HCPCS: Performed by: NURSE PRACTITIONER

## 2020-11-04 PROCEDURE — 6360000002 HC RX W HCPCS: Performed by: PSYCHIATRY & NEUROLOGY

## 2020-11-04 PROCEDURE — 2580000003 HC RX 258: Performed by: PSYCHIATRY & NEUROLOGY

## 2020-11-04 PROCEDURE — 2580000003 HC RX 258: Performed by: NURSE PRACTITIONER

## 2020-11-04 PROCEDURE — 96366 THER/PROPH/DIAG IV INF ADDON: CPT

## 2020-11-04 PROCEDURE — 6370000000 HC RX 637 (ALT 250 FOR IP): Performed by: NURSE PRACTITIONER

## 2020-11-04 RX ORDER — SODIUM CHLORIDE 9 MG/ML
INJECTION, SOLUTION INTRAVENOUS CONTINUOUS
Status: CANCELLED
Start: 2021-04-07

## 2020-11-04 RX ORDER — METHYLPREDNISOLONE SODIUM SUCCINATE 125 MG/2ML
125 INJECTION, POWDER, LYOPHILIZED, FOR SOLUTION INTRAMUSCULAR; INTRAVENOUS ONCE
Status: CANCELLED | OUTPATIENT
Start: 2021-04-07

## 2020-11-04 RX ORDER — METHYLPREDNISOLONE SODIUM SUCCINATE 125 MG/2ML
100 INJECTION, POWDER, LYOPHILIZED, FOR SOLUTION INTRAMUSCULAR; INTRAVENOUS ONCE
Status: CANCELLED | OUTPATIENT
Start: 2021-04-07

## 2020-11-04 RX ORDER — SODIUM CHLORIDE 0.9 % (FLUSH) 0.9 %
5 SYRINGE (ML) INJECTION PRN
Status: DISCONTINUED | OUTPATIENT
Start: 2020-11-04 | End: 2020-11-05 | Stop reason: HOSPADM

## 2020-11-04 RX ORDER — SODIUM CHLORIDE 0.9 % (FLUSH) 0.9 %
10 SYRINGE (ML) INJECTION PRN
Status: CANCELLED | OUTPATIENT
Start: 2021-04-07

## 2020-11-04 RX ORDER — CETIRIZINE HYDROCHLORIDE 10 MG/1
10 TABLET ORAL ONCE
Status: COMPLETED | OUTPATIENT
Start: 2020-11-04 | End: 2020-11-04

## 2020-11-04 RX ORDER — ACETAMINOPHEN 500 MG
1000 TABLET ORAL ONCE
Status: COMPLETED | OUTPATIENT
Start: 2020-11-04 | End: 2020-11-04

## 2020-11-04 RX ORDER — HEPARIN SODIUM (PORCINE) LOCK FLUSH IV SOLN 100 UNIT/ML 100 UNIT/ML
500 SOLUTION INTRAVENOUS PRN
Status: DISCONTINUED | OUTPATIENT
Start: 2020-11-04 | End: 2020-11-05 | Stop reason: HOSPADM

## 2020-11-04 RX ORDER — SODIUM CHLORIDE 0.9 % (FLUSH) 0.9 %
5 SYRINGE (ML) INJECTION PRN
Status: CANCELLED | OUTPATIENT
Start: 2021-04-07

## 2020-11-04 RX ORDER — HEPARIN SODIUM (PORCINE) LOCK FLUSH IV SOLN 100 UNIT/ML 100 UNIT/ML
500 SOLUTION INTRAVENOUS PRN
Status: CANCELLED | OUTPATIENT
Start: 2021-04-07

## 2020-11-04 RX ORDER — EPINEPHRINE 1 MG/ML
0.3 INJECTION, SOLUTION, CONCENTRATE INTRAVENOUS PRN
Status: CANCELLED | OUTPATIENT
Start: 2021-04-07

## 2020-11-04 RX ORDER — CETIRIZINE HYDROCHLORIDE 10 MG/1
10 TABLET ORAL ONCE
Status: CANCELLED
Start: 2021-04-07

## 2020-11-04 RX ORDER — SODIUM CHLORIDE 9 MG/ML
INJECTION, SOLUTION INTRAVENOUS CONTINUOUS
Status: DISCONTINUED | OUTPATIENT
Start: 2020-11-04 | End: 2020-11-05 | Stop reason: HOSPADM

## 2020-11-04 RX ORDER — SODIUM CHLORIDE 0.9 % (FLUSH) 0.9 %
10 SYRINGE (ML) INJECTION PRN
Status: DISCONTINUED | OUTPATIENT
Start: 2020-11-04 | End: 2020-11-05 | Stop reason: HOSPADM

## 2020-11-04 RX ORDER — METHYLPREDNISOLONE SODIUM SUCCINATE 125 MG/2ML
100 INJECTION, POWDER, LYOPHILIZED, FOR SOLUTION INTRAMUSCULAR; INTRAVENOUS ONCE
Status: COMPLETED | OUTPATIENT
Start: 2020-11-04 | End: 2020-11-04

## 2020-11-04 RX ORDER — SODIUM CHLORIDE 9 MG/ML
INJECTION, SOLUTION INTRAVENOUS CONTINUOUS
Status: CANCELLED | OUTPATIENT
Start: 2021-04-07

## 2020-11-04 RX ORDER — ACETAMINOPHEN 500 MG
1000 TABLET ORAL ONCE
Status: CANCELLED | OUTPATIENT
Start: 2021-04-07

## 2020-11-04 RX ORDER — DIPHENHYDRAMINE HYDROCHLORIDE 50 MG/ML
50 INJECTION INTRAMUSCULAR; INTRAVENOUS ONCE
Status: CANCELLED | OUTPATIENT
Start: 2021-04-07

## 2020-11-04 RX ORDER — SODIUM CHLORIDE 0.9 % (FLUSH) 0.9 %
10 SYRINGE (ML) INJECTION ONCE
Status: CANCELLED
Start: 2021-04-07

## 2020-11-04 RX ORDER — SODIUM CHLORIDE 0.9 % (FLUSH) 0.9 %
10 SYRINGE (ML) INJECTION ONCE
Status: DISCONTINUED | OUTPATIENT
Start: 2020-11-04 | End: 2020-11-05 | Stop reason: HOSPADM

## 2020-11-04 RX ADMIN — METHYLPREDNISOLONE SODIUM SUCCINATE 100 MG: 125 INJECTION, POWDER, FOR SOLUTION INTRAMUSCULAR; INTRAVENOUS at 08:26

## 2020-11-04 RX ADMIN — SODIUM CHLORIDE, PRESERVATIVE FREE 10 ML: 5 INJECTION INTRAVENOUS at 08:19

## 2020-11-04 RX ADMIN — SODIUM CHLORIDE: 9 INJECTION, SOLUTION INTRAVENOUS at 08:24

## 2020-11-04 RX ADMIN — OCRELIZUMAB 600 MG: 300 INJECTION INTRAVENOUS at 09:03

## 2020-11-04 RX ADMIN — CETIRIZINE HYDROCHLORIDE 10 MG: 10 TABLET, FILM COATED ORAL at 08:33

## 2020-11-04 RX ADMIN — FAMOTIDINE 20 MG: 10 INJECTION, SOLUTION INTRAVENOUS at 08:30

## 2020-11-04 RX ADMIN — ACETAMINOPHEN 1000 MG: 500 TABLET ORAL at 08:19

## 2020-11-04 ASSESSMENT — PAIN SCALES - GENERAL: PAINLEVEL_OUTOF10: 0

## 2021-05-04 RX ORDER — HEPARIN SODIUM (PORCINE) LOCK FLUSH IV SOLN 100 UNIT/ML 100 UNIT/ML
500 SOLUTION INTRAVENOUS PRN
Status: CANCELLED | OUTPATIENT
Start: 2021-05-04

## 2021-05-04 RX ORDER — ACETAMINOPHEN 500 MG
1000 TABLET ORAL ONCE
Status: CANCELLED | OUTPATIENT
Start: 2021-05-04

## 2021-05-04 RX ORDER — SODIUM CHLORIDE 9 MG/ML
INJECTION, SOLUTION INTRAVENOUS CONTINUOUS
Status: CANCELLED
Start: 2021-05-04

## 2021-05-04 RX ORDER — SODIUM CHLORIDE 0.9 % (FLUSH) 0.9 %
10 SYRINGE (ML) INJECTION PRN
Status: CANCELLED | OUTPATIENT
Start: 2021-05-04

## 2021-05-04 RX ORDER — SODIUM CHLORIDE 0.9 % (FLUSH) 0.9 %
5 SYRINGE (ML) INJECTION PRN
Status: CANCELLED | OUTPATIENT
Start: 2021-05-04

## 2021-05-05 ENCOUNTER — HOSPITAL ENCOUNTER (OUTPATIENT)
Dept: INFUSION THERAPY | Age: 51
Setting detail: INFUSION SERIES
Discharge: HOME | End: 2021-05-05
Payer: COMMERCIAL

## 2021-05-05 VITALS
HEART RATE: 116 BPM | DIASTOLIC BLOOD PRESSURE: 79 MMHG | RESPIRATION RATE: 18 BRPM | TEMPERATURE: 99.8 F | SYSTOLIC BLOOD PRESSURE: 121 MMHG

## 2021-05-05 DIAGNOSIS — G35 MULTIPLE SCLEROSIS (HCC): Primary | ICD-10-CM

## 2021-05-05 PROCEDURE — 6370000000 HC RX 637 (ALT 250 FOR IP): Performed by: PSYCHIATRY & NEUROLOGY

## 2021-05-05 PROCEDURE — 96375 TX/PRO/DX INJ NEW DRUG ADDON: CPT

## 2021-05-05 PROCEDURE — 96365 THER/PROPH/DIAG IV INF INIT: CPT

## 2021-05-05 PROCEDURE — 96366 THER/PROPH/DIAG IV INF ADDON: CPT

## 2021-05-05 PROCEDURE — 6360000002 HC RX W HCPCS: Performed by: PSYCHIATRY & NEUROLOGY

## 2021-05-05 PROCEDURE — 6370000000 HC RX 637 (ALT 250 FOR IP)

## 2021-05-05 PROCEDURE — 2580000003 HC RX 258: Performed by: PSYCHIATRY & NEUROLOGY

## 2021-05-05 RX ORDER — ACETAMINOPHEN 500 MG
1000 TABLET ORAL ONCE
Status: CANCELLED
Start: 2021-10-06 | End: 2021-10-06

## 2021-05-05 RX ORDER — CETIRIZINE HYDROCHLORIDE 10 MG/1
10 TABLET ORAL ONCE
Status: COMPLETED | OUTPATIENT
Start: 2021-05-05 | End: 2021-05-05

## 2021-05-05 RX ORDER — DIPHENHYDRAMINE HYDROCHLORIDE 50 MG/ML
50 INJECTION INTRAMUSCULAR; INTRAVENOUS ONCE
Status: CANCELLED | OUTPATIENT
Start: 2021-10-06 | End: 2021-10-06

## 2021-05-05 RX ORDER — ACETAMINOPHEN 500 MG
1000 TABLET ORAL ONCE
Status: CANCELLED
Start: 2021-05-05 | End: 2021-05-05

## 2021-05-05 RX ORDER — ACETAMINOPHEN 500 MG
1000 TABLET ORAL ONCE
Status: CANCELLED | OUTPATIENT
Start: 2021-10-06

## 2021-05-05 RX ORDER — CETIRIZINE HYDROCHLORIDE 10 MG/1
10 TABLET ORAL ONCE
Status: CANCELLED
Start: 2021-10-06 | End: 2021-10-06

## 2021-05-05 RX ORDER — LORAZEPAM 0.5 MG/1
0.5 TABLET ORAL EVERY 12 HOURS PRN
COMMUNITY

## 2021-05-05 RX ORDER — FAMOTIDINE 20 MG/1
20 TABLET, FILM COATED ORAL ONCE
Status: CANCELLED
Start: 2021-10-06 | End: 2021-10-06

## 2021-05-05 RX ORDER — ACETAMINOPHEN 500 MG
1000 TABLET ORAL ONCE
Status: COMPLETED | OUTPATIENT
Start: 2021-05-05 | End: 2021-05-05

## 2021-05-05 RX ORDER — HEPARIN SODIUM (PORCINE) LOCK FLUSH IV SOLN 100 UNIT/ML 100 UNIT/ML
500 SOLUTION INTRAVENOUS PRN
Status: CANCELLED | OUTPATIENT
Start: 2021-10-06

## 2021-05-05 RX ORDER — METHYLPREDNISOLONE SODIUM SUCCINATE 125 MG/2ML
100 INJECTION, POWDER, LYOPHILIZED, FOR SOLUTION INTRAMUSCULAR; INTRAVENOUS ONCE
Status: CANCELLED | OUTPATIENT
Start: 2021-10-06

## 2021-05-05 RX ORDER — SODIUM CHLORIDE 0.9 % (FLUSH) 0.9 %
10 SYRINGE (ML) INJECTION PRN
Status: DISCONTINUED | OUTPATIENT
Start: 2021-05-05 | End: 2021-05-06 | Stop reason: HOSPADM

## 2021-05-05 RX ORDER — FAMOTIDINE 20 MG/1
20 TABLET, FILM COATED ORAL ONCE
Status: CANCELLED
Start: 2021-05-05 | End: 2021-05-05

## 2021-05-05 RX ORDER — PAROXETINE HYDROCHLORIDE 20 MG/1
20 TABLET, FILM COATED ORAL EVERY MORNING
COMMUNITY
End: 2021-11-05

## 2021-05-05 RX ORDER — ACETAMINOPHEN 500 MG
TABLET ORAL
Status: COMPLETED
Start: 2021-05-05 | End: 2021-05-05

## 2021-05-05 RX ORDER — SODIUM CHLORIDE 9 MG/ML
INJECTION, SOLUTION INTRAVENOUS CONTINUOUS
Status: CANCELLED
Start: 2021-10-06

## 2021-05-05 RX ORDER — SODIUM CHLORIDE 0.9 % (FLUSH) 0.9 %
5 SYRINGE (ML) INJECTION PRN
Status: CANCELLED | OUTPATIENT
Start: 2021-10-06

## 2021-05-05 RX ORDER — METHYLPREDNISOLONE SODIUM SUCCINATE 125 MG/2ML
100 INJECTION, POWDER, LYOPHILIZED, FOR SOLUTION INTRAMUSCULAR; INTRAVENOUS ONCE
Status: COMPLETED | OUTPATIENT
Start: 2021-05-05 | End: 2021-05-05

## 2021-05-05 RX ORDER — DIPHENHYDRAMINE HYDROCHLORIDE 50 MG/ML
50 INJECTION INTRAMUSCULAR; INTRAVENOUS ONCE
Status: CANCELLED | OUTPATIENT
Start: 2021-05-05 | End: 2021-05-05

## 2021-05-05 RX ORDER — SODIUM CHLORIDE 0.9 % (FLUSH) 0.9 %
10 SYRINGE (ML) INJECTION PRN
Status: CANCELLED | OUTPATIENT
Start: 2021-10-06

## 2021-05-05 RX ORDER — FAMOTIDINE 20 MG/1
20 TABLET, FILM COATED ORAL ONCE
Status: COMPLETED | OUTPATIENT
Start: 2021-05-05 | End: 2021-05-05

## 2021-05-05 RX ORDER — SODIUM CHLORIDE 9 MG/ML
INJECTION, SOLUTION INTRAVENOUS CONTINUOUS
Status: DISCONTINUED | OUTPATIENT
Start: 2021-05-05 | End: 2021-05-06 | Stop reason: HOSPADM

## 2021-05-05 RX ADMIN — ACETAMINOPHEN 1000 MG: 500 TABLET ORAL at 09:17

## 2021-05-05 RX ADMIN — METHYLPREDNISOLONE SODIUM SUCCINATE 100 MG: 125 INJECTION, POWDER, FOR SOLUTION INTRAMUSCULAR; INTRAVENOUS at 09:28

## 2021-05-05 RX ADMIN — OCRELIZUMAB 600 MG: 300 INJECTION INTRAVENOUS at 10:25

## 2021-05-05 RX ADMIN — CETIRIZINE HYDROCHLORIDE 10 MG: 10 TABLET, FILM COATED ORAL at 09:53

## 2021-05-05 RX ADMIN — FAMOTIDINE 20 MG: 20 TABLET ORAL at 09:54

## 2021-05-05 RX ADMIN — SODIUM CHLORIDE: 9 INJECTION, SOLUTION INTRAVENOUS at 09:34

## 2021-05-05 RX ADMIN — Medication 1000 MG: at 09:17

## 2021-11-05 ENCOUNTER — HOSPITAL ENCOUNTER (OUTPATIENT)
Dept: INFUSION THERAPY | Age: 51
Setting detail: INFUSION SERIES
Discharge: HOME | End: 2021-11-05
Payer: COMMERCIAL

## 2021-11-05 VITALS
OXYGEN SATURATION: 97 % | DIASTOLIC BLOOD PRESSURE: 85 MMHG | SYSTOLIC BLOOD PRESSURE: 151 MMHG | HEART RATE: 114 BPM | TEMPERATURE: 98.6 F | RESPIRATION RATE: 12 BRPM

## 2021-11-05 DIAGNOSIS — G35 MULTIPLE SCLEROSIS (HCC): Primary | ICD-10-CM

## 2021-11-05 PROCEDURE — 96366 THER/PROPH/DIAG IV INF ADDON: CPT

## 2021-11-05 PROCEDURE — 6370000000 HC RX 637 (ALT 250 FOR IP): Performed by: PSYCHIATRY & NEUROLOGY

## 2021-11-05 PROCEDURE — 96375 TX/PRO/DX INJ NEW DRUG ADDON: CPT

## 2021-11-05 PROCEDURE — 6360000002 HC RX W HCPCS: Performed by: PSYCHIATRY & NEUROLOGY

## 2021-11-05 PROCEDURE — 96365 THER/PROPH/DIAG IV INF INIT: CPT

## 2021-11-05 PROCEDURE — 2580000003 HC RX 258: Performed by: PSYCHIATRY & NEUROLOGY

## 2021-11-05 RX ORDER — HEPARIN SODIUM (PORCINE) LOCK FLUSH IV SOLN 100 UNIT/ML 100 UNIT/ML
500 SOLUTION INTRAVENOUS PRN
Status: CANCELLED | OUTPATIENT
Start: 2022-03-25

## 2021-11-05 RX ORDER — METHYLPREDNISOLONE SODIUM SUCCINATE 125 MG/2ML
100 INJECTION, POWDER, LYOPHILIZED, FOR SOLUTION INTRAMUSCULAR; INTRAVENOUS ONCE
Status: CANCELLED | OUTPATIENT
Start: 2022-03-25

## 2021-11-05 RX ORDER — DIPHENHYDRAMINE HYDROCHLORIDE 50 MG/ML
50 INJECTION INTRAMUSCULAR; INTRAVENOUS ONCE
Status: CANCELLED | OUTPATIENT
Start: 2022-03-25 | End: 2022-03-25

## 2021-11-05 RX ORDER — SODIUM CHLORIDE 9 MG/ML
INJECTION, SOLUTION INTRAVENOUS CONTINUOUS
Status: DISCONTINUED | OUTPATIENT
Start: 2021-11-05 | End: 2021-11-06 | Stop reason: HOSPADM

## 2021-11-05 RX ORDER — SODIUM CHLORIDE 0.9 % (FLUSH) 0.9 %
10 SYRINGE (ML) INJECTION PRN
Status: DISCONTINUED | OUTPATIENT
Start: 2021-11-05 | End: 2021-11-06 | Stop reason: HOSPADM

## 2021-11-05 RX ORDER — FAMOTIDINE 20 MG/1
20 TABLET, FILM COATED ORAL ONCE
Status: CANCELLED
Start: 2022-03-25 | End: 2022-03-25

## 2021-11-05 RX ORDER — ACETAMINOPHEN 500 MG
1000 TABLET ORAL ONCE
Status: CANCELLED
Start: 2022-03-25 | End: 2022-03-25

## 2021-11-05 RX ORDER — SODIUM CHLORIDE 0.9 % (FLUSH) 0.9 %
10 SYRINGE (ML) INJECTION PRN
Status: CANCELLED | OUTPATIENT
Start: 2022-03-25

## 2021-11-05 RX ORDER — METHYLPREDNISOLONE SODIUM SUCCINATE 125 MG/2ML
100 INJECTION, POWDER, LYOPHILIZED, FOR SOLUTION INTRAMUSCULAR; INTRAVENOUS ONCE
Status: COMPLETED | OUTPATIENT
Start: 2021-11-05 | End: 2021-11-05

## 2021-11-05 RX ORDER — SODIUM CHLORIDE 0.9 % (FLUSH) 0.9 %
5 SYRINGE (ML) INJECTION PRN
Status: CANCELLED | OUTPATIENT
Start: 2022-03-25

## 2021-11-05 RX ORDER — CETIRIZINE HYDROCHLORIDE 10 MG/1
10 TABLET ORAL ONCE
Status: CANCELLED
Start: 2022-03-25 | End: 2022-03-25

## 2021-11-05 RX ORDER — ACETAMINOPHEN 500 MG
1000 TABLET ORAL ONCE
Status: COMPLETED | OUTPATIENT
Start: 2021-11-05 | End: 2021-11-05

## 2021-11-05 RX ORDER — SODIUM CHLORIDE 0.9 % (FLUSH) 0.9 %
5 SYRINGE (ML) INJECTION PRN
Status: DISCONTINUED | OUTPATIENT
Start: 2021-11-05 | End: 2021-11-06 | Stop reason: HOSPADM

## 2021-11-05 RX ORDER — SODIUM CHLORIDE 9 MG/ML
INJECTION, SOLUTION INTRAVENOUS CONTINUOUS
Status: CANCELLED
Start: 2022-03-25

## 2021-11-05 RX ORDER — ACETAMINOPHEN 500 MG
1000 TABLET ORAL ONCE
Status: CANCELLED | OUTPATIENT
Start: 2022-03-25

## 2021-11-05 RX ORDER — FAMOTIDINE 20 MG/1
20 TABLET, FILM COATED ORAL ONCE
Status: COMPLETED | OUTPATIENT
Start: 2021-11-05 | End: 2021-11-05

## 2021-11-05 RX ORDER — CETIRIZINE HYDROCHLORIDE 10 MG/1
10 TABLET ORAL ONCE
Status: COMPLETED | OUTPATIENT
Start: 2021-11-05 | End: 2021-11-05

## 2021-11-05 RX ORDER — HEPARIN SODIUM (PORCINE) LOCK FLUSH IV SOLN 100 UNIT/ML 100 UNIT/ML
500 SOLUTION INTRAVENOUS PRN
Status: DISCONTINUED | OUTPATIENT
Start: 2021-11-05 | End: 2021-11-06 | Stop reason: HOSPADM

## 2021-11-05 ASSESSMENT — PAIN SCALES - GENERAL
PAINLEVEL_OUTOF10: 0
PAINLEVEL_OUTOF10: 0

## 2021-11-05 NOTE — PROGRESS NOTES
Declines d/c instructions. Remained on unit for 1 hour post ocrevus infusion. No complications, d/c in stable condition.

## 2022-03-03 RX ORDER — ACETAMINOPHEN 325 MG/1
650 TABLET ORAL ONCE
Status: CANCELLED | OUTPATIENT
Start: 2022-03-03

## 2022-03-03 RX ORDER — SODIUM CHLORIDE 0.9 % (FLUSH) 0.9 %
10 SYRINGE (ML) INJECTION PRN
Status: CANCELLED | OUTPATIENT
Start: 2022-03-03

## 2022-03-03 RX ORDER — METHYLPREDNISOLONE SODIUM SUCCINATE 125 MG/2ML
100 INJECTION, POWDER, LYOPHILIZED, FOR SOLUTION INTRAMUSCULAR; INTRAVENOUS ONCE
Status: CANCELLED | OUTPATIENT
Start: 2022-03-03

## 2022-03-03 RX ORDER — EPINEPHRINE 1 MG/ML
0.3 INJECTION, SOLUTION, CONCENTRATE INTRAVENOUS PRN
OUTPATIENT
Start: 2022-03-03

## 2022-03-03 RX ORDER — ONDANSETRON 2 MG/ML
8 INJECTION INTRAMUSCULAR; INTRAVENOUS ONCE
Start: 2022-03-03 | End: 2022-03-03

## 2022-03-03 RX ORDER — ALBUTEROL SULFATE 90 UG/1
4 AEROSOL, METERED RESPIRATORY (INHALATION) PRN
Start: 2022-03-03

## 2022-03-03 RX ORDER — DIPHENHYDRAMINE HCL 25 MG
25 TABLET ORAL ONCE
Status: CANCELLED
Start: 2022-03-03 | End: 2022-03-03

## 2022-03-03 RX ORDER — MEPERIDINE HYDROCHLORIDE 25 MG/ML
25 INJECTION INTRAMUSCULAR; INTRAVENOUS; SUBCUTANEOUS ONCE
Start: 2022-03-03 | End: 2022-03-03

## 2022-03-03 RX ORDER — DIPHENHYDRAMINE HYDROCHLORIDE 50 MG/ML
50 INJECTION INTRAMUSCULAR; INTRAVENOUS ONCE
OUTPATIENT
Start: 2022-03-03 | End: 2022-03-03

## 2022-03-03 RX ORDER — SODIUM CHLORIDE 0.9 % (FLUSH) 0.9 %
5 SYRINGE (ML) INJECTION PRN
OUTPATIENT
Start: 2022-03-03

## 2022-03-03 RX ORDER — HEPARIN SODIUM (PORCINE) LOCK FLUSH IV SOLN 100 UNIT/ML 100 UNIT/ML
500 SOLUTION INTRAVENOUS PRN
Status: CANCELLED | OUTPATIENT
Start: 2022-03-03

## 2022-03-03 RX ORDER — SODIUM CHLORIDE 9 MG/ML
INJECTION, SOLUTION INTRAVENOUS CONTINUOUS
Status: CANCELLED
Start: 2022-03-03

## 2022-03-03 RX ORDER — ACETAMINOPHEN 325 MG/1
650 TABLET ORAL ONCE
Start: 2022-03-03 | End: 2022-03-03

## 2022-03-03 RX ORDER — SODIUM CHLORIDE 9 MG/ML
INJECTION, SOLUTION INTRAVENOUS CONTINUOUS
OUTPATIENT
Start: 2022-03-03

## 2022-04-08 ENCOUNTER — TELEPHONE (OUTPATIENT)
Dept: ADMINISTRATIVE | Age: 52
End: 2022-04-08

## 2022-04-08 NOTE — TELEPHONE ENCOUNTER
Patient is a facility patient, Flores Fonseca at [phone] asked that someone call her to RS patients appointment. They can only provide transportation for patient on Wednesdays and Thursdays. Thank you!

## 2022-04-28 ENCOUNTER — OFFICE VISIT (OUTPATIENT)
Dept: OBGYN | Age: 52
End: 2022-04-28
Payer: COMMERCIAL

## 2022-04-28 VITALS — HEART RATE: 78 BPM | DIASTOLIC BLOOD PRESSURE: 78 MMHG | SYSTOLIC BLOOD PRESSURE: 134 MMHG

## 2022-04-28 DIAGNOSIS — N95.0 PMB (POSTMENOPAUSAL BLEEDING): Primary | ICD-10-CM

## 2022-04-28 DIAGNOSIS — Z12.4 PAP SMEAR FOR CERVICAL CANCER SCREENING: ICD-10-CM

## 2022-04-28 PROCEDURE — 99203 OFFICE O/P NEW LOW 30 MIN: CPT | Performed by: OBSTETRICS & GYNECOLOGY

## 2022-04-28 PROCEDURE — G8421 BMI NOT CALCULATED: HCPCS | Performed by: OBSTETRICS & GYNECOLOGY

## 2022-04-28 PROCEDURE — 3017F COLORECTAL CA SCREEN DOC REV: CPT | Performed by: OBSTETRICS & GYNECOLOGY

## 2022-04-28 PROCEDURE — 1036F TOBACCO NON-USER: CPT | Performed by: OBSTETRICS & GYNECOLOGY

## 2022-04-28 PROCEDURE — G8428 CUR MEDS NOT DOCUMENT: HCPCS | Performed by: OBSTETRICS & GYNECOLOGY

## 2022-04-28 RX ORDER — FLUOXETINE HYDROCHLORIDE 20 MG/1
20 CAPSULE ORAL DAILY
COMMUNITY

## 2022-04-28 RX ORDER — LORATADINE 10 MG/1
10 CAPSULE, LIQUID FILLED ORAL DAILY PRN
COMMUNITY

## 2022-04-28 RX ORDER — ERGOCALCIFEROL 1.25 MG/1
50000 CAPSULE ORAL WEEKLY
COMMUNITY

## 2022-04-28 RX ORDER — SERTRALINE HYDROCHLORIDE 25 MG/1
25 TABLET, FILM COATED ORAL DAILY
COMMUNITY
End: 2022-04-28 | Stop reason: CLARIF

## 2022-04-28 NOTE — PROGRESS NOTES
Patient is new to office and presents from the [facility] at [facility]. She was referred for vaginal spotting. Patient states this occurred on 3/25/22 and lasted for 4 days. Last pap in chart was 2011. Last mammogram unknown per patient. Patient has hx of MS and utilizes a wheelchair. Patient was transferred from wheelchair to exam table with 2 person assist.  Pelvic exam done by Dr. Mel Louie. Pap smear obtained, labeled and hand delivered to lab. Consult report completed by Dr. Mel Louie and given to patient. Discharge instructions have been discussed with the patient by Dr. Mel Louie and she voiced understanding of plan of care. Patient advised to call our office with any questions or concerns.

## 2022-05-02 LAB
HPV SAMPLE: NORMAL
HPV TYPE 16: NOT DETECTED
HPV TYPE 18: NOT DETECTED
HPV, HIGH RISK OTHER: NOT DETECTED
INTERPRETATION: NORMAL
SOURCE: NORMAL

## 2022-05-06 ENCOUNTER — HOSPITAL ENCOUNTER (OUTPATIENT)
Dept: INFUSION THERAPY | Age: 52
Setting detail: INFUSION SERIES
End: 2022-05-06

## 2022-05-25 ENCOUNTER — OFFICE VISIT (OUTPATIENT)
Dept: OBGYN | Age: 52
End: 2022-05-25
Payer: COMMERCIAL

## 2022-05-25 VITALS
HEART RATE: 82 BPM | RESPIRATION RATE: 16 BRPM | SYSTOLIC BLOOD PRESSURE: 136 MMHG | WEIGHT: 187 LBS | BODY MASS INDEX: 36.71 KG/M2 | HEIGHT: 60 IN | DIASTOLIC BLOOD PRESSURE: 78 MMHG

## 2022-05-25 DIAGNOSIS — N95.0 PMB (POSTMENOPAUSAL BLEEDING): Primary | ICD-10-CM

## 2022-05-25 PROCEDURE — 1036F TOBACCO NON-USER: CPT | Performed by: OBSTETRICS & GYNECOLOGY

## 2022-05-25 PROCEDURE — 3017F COLORECTAL CA SCREEN DOC REV: CPT | Performed by: OBSTETRICS & GYNECOLOGY

## 2022-05-25 PROCEDURE — G8427 DOCREV CUR MEDS BY ELIG CLIN: HCPCS | Performed by: OBSTETRICS & GYNECOLOGY

## 2022-05-25 PROCEDURE — G8417 CALC BMI ABV UP PARAM F/U: HCPCS | Performed by: OBSTETRICS & GYNECOLOGY

## 2022-05-25 PROCEDURE — 99212 OFFICE O/P EST SF 10 MIN: CPT | Performed by: OBSTETRICS & GYNECOLOGY

## 2022-05-25 NOTE — PROGRESS NOTES
Here today for sonogram results. We ordered that for some postmenopausal spotting. The sonogram was done by [facility] and shows a quite thin lining od the endometrium, 3 mm. Discussed with the patient a length and I believe with the thin lining she has just atrophic or inactive endometrium and nothing further to do except watch this. Sonogram report can be see under media in this chart. In addition her pap as well as her HPV were both negative so would suggest a recheck in about 2-3 years or PRN. See with any further bleeding. Patient voices understanding.

## 2022-05-26 ENCOUNTER — HOSPITAL ENCOUNTER (OUTPATIENT)
Dept: MRI IMAGING | Age: 52
Discharge: HOME | End: 2022-05-28
Payer: COMMERCIAL

## 2022-05-26 DIAGNOSIS — G35 MULTIPLE SCLEROSIS (HCC): ICD-10-CM

## 2022-05-26 PROCEDURE — 70553 MRI BRAIN STEM W/O & W/DYE: CPT

## 2022-05-26 PROCEDURE — 6360000004 HC RX CONTRAST MEDICATION: Performed by: RADIOLOGY

## 2022-05-26 PROCEDURE — A9577 INJ MULTIHANCE: HCPCS | Performed by: RADIOLOGY

## 2022-05-26 RX ADMIN — GADOBENATE DIMEGLUMINE 17 ML: 529 INJECTION, SOLUTION INTRAVENOUS at 07:55

## 2022-06-30 ENCOUNTER — HOSPITAL ENCOUNTER (OUTPATIENT)
Dept: INFUSION THERAPY | Age: 52
Setting detail: INFUSION SERIES
End: 2022-06-30

## 2022-07-07 ENCOUNTER — HOSPITAL ENCOUNTER (OUTPATIENT)
Dept: INFUSION THERAPY | Age: 52
Setting detail: INFUSION SERIES
Discharge: HOME | End: 2022-07-07
Payer: COMMERCIAL

## 2022-07-07 VITALS
TEMPERATURE: 98.5 F | RESPIRATION RATE: 16 BRPM | DIASTOLIC BLOOD PRESSURE: 78 MMHG | HEART RATE: 110 BPM | OXYGEN SATURATION: 98 % | SYSTOLIC BLOOD PRESSURE: 153 MMHG

## 2022-07-07 DIAGNOSIS — G35 MULTIPLE SCLEROSIS (HCC): Primary | ICD-10-CM

## 2022-07-07 PROCEDURE — 2580000003 HC RX 258

## 2022-07-07 PROCEDURE — 6360000002 HC RX W HCPCS

## 2022-07-07 PROCEDURE — 2580000003 HC RX 258: Performed by: PSYCHIATRY & NEUROLOGY

## 2022-07-07 PROCEDURE — 6360000002 HC RX W HCPCS: Performed by: PSYCHIATRY & NEUROLOGY

## 2022-07-07 PROCEDURE — 96375 TX/PRO/DX INJ NEW DRUG ADDON: CPT

## 2022-07-07 PROCEDURE — 96366 THER/PROPH/DIAG IV INF ADDON: CPT

## 2022-07-07 PROCEDURE — 6370000000 HC RX 637 (ALT 250 FOR IP)

## 2022-07-07 PROCEDURE — 96365 THER/PROPH/DIAG IV INF INIT: CPT

## 2022-07-07 RX ORDER — SODIUM CHLORIDE 0.9 % (FLUSH) 0.9 %
10 SYRINGE (ML) INJECTION PRN
Status: DISCONTINUED | OUTPATIENT
Start: 2022-07-07 | End: 2022-07-08 | Stop reason: HOSPADM

## 2022-07-07 RX ORDER — DIPHENHYDRAMINE HCL 25 MG
25 TABLET ORAL ONCE
Status: DISCONTINUED | OUTPATIENT
Start: 2022-07-07 | End: 2022-07-08 | Stop reason: HOSPADM

## 2022-07-07 RX ORDER — EPINEPHRINE 1 MG/ML
0.3 INJECTION, SOLUTION, CONCENTRATE INTRAVENOUS PRN
OUTPATIENT
Start: 2022-08-18

## 2022-07-07 RX ORDER — METHYLPREDNISOLONE SODIUM SUCCINATE 125 MG/2ML
100 INJECTION, POWDER, LYOPHILIZED, FOR SOLUTION INTRAMUSCULAR; INTRAVENOUS ONCE
OUTPATIENT
Start: 2022-08-18

## 2022-07-07 RX ORDER — METHYLPREDNISOLONE SODIUM SUCCINATE 125 MG/2ML
INJECTION, POWDER, LYOPHILIZED, FOR SOLUTION INTRAMUSCULAR; INTRAVENOUS
Status: COMPLETED
Start: 2022-07-07 | End: 2022-07-07

## 2022-07-07 RX ORDER — ALBUTEROL SULFATE 90 UG/1
4 AEROSOL, METERED RESPIRATORY (INHALATION) PRN
Start: 2022-08-18

## 2022-07-07 RX ORDER — ACETAMINOPHEN 325 MG/1
650 TABLET ORAL ONCE
Status: COMPLETED | OUTPATIENT
Start: 2022-07-07 | End: 2022-07-07

## 2022-07-07 RX ORDER — DIPHENHYDRAMINE HYDROCHLORIDE 50 MG/ML
50 INJECTION INTRAMUSCULAR; INTRAVENOUS ONCE
OUTPATIENT
Start: 2022-08-18 | End: 2022-08-18

## 2022-07-07 RX ORDER — ACETAMINOPHEN 325 MG/1
650 TABLET ORAL ONCE
OUTPATIENT
Start: 2022-08-18

## 2022-07-07 RX ORDER — ACETAMINOPHEN 325 MG/1
TABLET ORAL
Status: COMPLETED
Start: 2022-07-07 | End: 2022-07-07

## 2022-07-07 RX ORDER — HEPARIN SODIUM (PORCINE) LOCK FLUSH IV SOLN 100 UNIT/ML 100 UNIT/ML
500 SOLUTION INTRAVENOUS PRN
Status: DISCONTINUED | OUTPATIENT
Start: 2022-07-07 | End: 2022-07-08 | Stop reason: HOSPADM

## 2022-07-07 RX ORDER — SODIUM CHLORIDE 0.9 % (FLUSH) 0.9 %
SYRINGE (ML) INJECTION
Status: COMPLETED
Start: 2022-07-07 | End: 2022-07-07

## 2022-07-07 RX ORDER — SODIUM CHLORIDE 0.9 % (FLUSH) 0.9 %
5 SYRINGE (ML) INJECTION PRN
OUTPATIENT
Start: 2022-08-18

## 2022-07-07 RX ORDER — SODIUM CHLORIDE 9 MG/ML
INJECTION, SOLUTION INTRAVENOUS CONTINUOUS
OUTPATIENT
Start: 2022-08-18

## 2022-07-07 RX ORDER — ACETAMINOPHEN 325 MG/1
650 TABLET ORAL ONCE
Start: 2022-08-18 | End: 2022-08-18

## 2022-07-07 RX ORDER — SODIUM CHLORIDE 9 MG/ML
INJECTION, SOLUTION INTRAVENOUS CONTINUOUS
Start: 2022-08-18

## 2022-07-07 RX ORDER — DIPHENHYDRAMINE HCL 25 MG
25 TABLET ORAL ONCE
Start: 2022-08-18 | End: 2022-08-18

## 2022-07-07 RX ORDER — METHYLPREDNISOLONE SODIUM SUCCINATE 125 MG/2ML
100 INJECTION, POWDER, LYOPHILIZED, FOR SOLUTION INTRAMUSCULAR; INTRAVENOUS ONCE
Status: COMPLETED | OUTPATIENT
Start: 2022-07-07 | End: 2022-07-07

## 2022-07-07 RX ORDER — SODIUM CHLORIDE 9 MG/ML
INJECTION, SOLUTION INTRAVENOUS CONTINUOUS
Status: DISCONTINUED | OUTPATIENT
Start: 2022-07-07 | End: 2022-07-08 | Stop reason: HOSPADM

## 2022-07-07 RX ORDER — HEPARIN SODIUM (PORCINE) LOCK FLUSH IV SOLN 100 UNIT/ML 100 UNIT/ML
500 SOLUTION INTRAVENOUS PRN
OUTPATIENT
Start: 2022-08-18

## 2022-07-07 RX ORDER — SODIUM CHLORIDE 0.9 % (FLUSH) 0.9 %
10 SYRINGE (ML) INJECTION PRN
OUTPATIENT
Start: 2022-08-18

## 2022-07-07 RX ORDER — ONDANSETRON 2 MG/ML
8 INJECTION INTRAMUSCULAR; INTRAVENOUS ONCE
Start: 2022-08-18 | End: 2022-08-18

## 2022-07-07 RX ORDER — MEPERIDINE HYDROCHLORIDE 25 MG/ML
25 INJECTION INTRAMUSCULAR; INTRAVENOUS; SUBCUTANEOUS ONCE
Start: 2022-08-18 | End: 2022-08-18

## 2022-07-07 RX ADMIN — SODIUM CHLORIDE, PRESERVATIVE FREE 10 ML: 5 INJECTION INTRAVENOUS at 09:21

## 2022-07-07 RX ADMIN — METHYLPREDNISOLONE SODIUM SUCCINATE 100 MG: 125 INJECTION, POWDER, LYOPHILIZED, FOR SOLUTION INTRAMUSCULAR; INTRAVENOUS at 09:31

## 2022-07-07 RX ADMIN — ACETAMINOPHEN 650 MG: 325 TABLET ORAL at 09:24

## 2022-07-07 RX ADMIN — METHYLPREDNISOLONE SODIUM SUCCINATE 100 MG: 125 INJECTION, POWDER, FOR SOLUTION INTRAMUSCULAR; INTRAVENOUS at 09:31

## 2022-07-07 RX ADMIN — OCRELIZUMAB 600 MG: 300 INJECTION INTRAVENOUS at 10:06

## 2022-07-07 RX ADMIN — Medication 10 ML: at 09:21

## 2022-07-07 RX ADMIN — SODIUM CHLORIDE: 9 INJECTION, SOLUTION INTRAVENOUS at 14:04

## 2022-07-07 ASSESSMENT — PAIN SCALES - GENERAL: PAINLEVEL_OUTOF10: 0

## 2022-08-17 NOTE — PROGRESS NOTES
Patient assisted up to bathroom or bedside commode almost every half hour.
[FreeTextEntry1] : 53 year male who comes to followup his BP. He sees  for peyronies. He had a prostate exam with  7 months ago and wants his prostate rechecked due to FH of prostate cancer. He denies having chest pain, SOB, DOMINGUEZ, dizziness, palpitations, orthopnea, PND or syncope

## 2022-11-26 ENCOUNTER — HOSPITAL ENCOUNTER (EMERGENCY)
Age: 52
Discharge: HOME | End: 2022-11-26
Attending: EMERGENCY MEDICINE
Payer: COMMERCIAL

## 2022-11-26 ENCOUNTER — APPOINTMENT (OUTPATIENT)
Dept: GENERAL RADIOLOGY | Age: 52
End: 2022-11-26
Payer: COMMERCIAL

## 2022-11-26 VITALS
OXYGEN SATURATION: 96 % | TEMPERATURE: 98.1 F | SYSTOLIC BLOOD PRESSURE: 106 MMHG | BODY MASS INDEX: 31.41 KG/M2 | HEART RATE: 95 BPM | WEIGHT: 160 LBS | RESPIRATION RATE: 18 BRPM | DIASTOLIC BLOOD PRESSURE: 53 MMHG | HEIGHT: 60 IN

## 2022-11-26 DIAGNOSIS — U07.1 COVID: ICD-10-CM

## 2022-11-26 DIAGNOSIS — R53.1 GENERALIZED WEAKNESS: Primary | ICD-10-CM

## 2022-11-26 LAB
ALBUMIN SERPL-MCNC: 4.2 G/DL (ref 3.5–5.2)
ALP BLD-CCNC: 142 U/L (ref 35–104)
ALT SERPL-CCNC: 36 U/L (ref 0–32)
ANION GAP SERPL CALCULATED.3IONS-SCNC: 10 MMOL/L (ref 7–16)
AST SERPL-CCNC: 31 U/L (ref 0–31)
BILIRUB SERPL-MCNC: 0.5 MG/DL (ref 0–1.2)
BUN BLDV-MCNC: 14 MG/DL (ref 6–20)
CALCIUM SERPL-MCNC: 9.4 MG/DL (ref 8.6–10.2)
CHLORIDE BLD-SCNC: 102 MMOL/L (ref 98–107)
CO2: 28 MMOL/L (ref 22–29)
CREAT SERPL-MCNC: 0.6 MG/DL (ref 0.5–1)
D DIMER: <200 NG/ML DDU
EKG ATRIAL RATE: 89 BPM
EKG P AXIS: 23 DEGREES
EKG P-R INTERVAL: 182 MS
EKG Q-T INTERVAL: 376 MS
EKG QRS DURATION: 84 MS
EKG QTC CALCULATION (BAZETT): 457 MS
EKG R AXIS: -8 DEGREES
EKG T AXIS: 27 DEGREES
EKG VENTRICULAR RATE: 89 BPM
GFR SERPL CREATININE-BSD FRML MDRD: >60 ML/MIN/1.73
GLUCOSE BLD-MCNC: 95 MG/DL (ref 74–99)
HCT VFR BLD CALC: 46.3 % (ref 34–48)
HEMOGLOBIN: 14.8 G/DL (ref 11.5–15.5)
MCH RBC QN AUTO: 29.3 PG (ref 26–35)
MCHC RBC AUTO-ENTMCNC: 32 % (ref 32–34.5)
MCV RBC AUTO: 91.7 FL (ref 80–99.9)
PDW BLD-RTO: 14.8 FL (ref 11.5–15)
PLATELET # BLD: 187 E9/L (ref 130–450)
PMV BLD AUTO: 10.8 FL (ref 7–12)
POTASSIUM SERPL-SCNC: 4.2 MMOL/L (ref 3.5–5)
RBC # BLD: 5.05 E12/L (ref 3.5–5.5)
SODIUM BLD-SCNC: 140 MMOL/L (ref 132–146)
TOTAL PROTEIN: 6.6 G/DL (ref 6.4–8.3)
TROPONIN, HIGH SENSITIVITY: 27 NG/L (ref 0–9)
TROPONIN, HIGH SENSITIVITY: 31 NG/L (ref 0–9)
WBC # BLD: 6.9 E9/L (ref 4.5–11.5)

## 2022-11-26 PROCEDURE — 85378 FIBRIN DEGRADE SEMIQUANT: CPT

## 2022-11-26 PROCEDURE — 84484 ASSAY OF TROPONIN QUANT: CPT

## 2022-11-26 PROCEDURE — 71045 X-RAY EXAM CHEST 1 VIEW: CPT

## 2022-11-26 PROCEDURE — 93010 ELECTROCARDIOGRAM REPORT: CPT | Performed by: INTERNAL MEDICINE

## 2022-11-26 PROCEDURE — 93005 ELECTROCARDIOGRAM TRACING: CPT

## 2022-11-26 PROCEDURE — 6370000000 HC RX 637 (ALT 250 FOR IP)

## 2022-11-26 PROCEDURE — 99285 EMERGENCY DEPT VISIT HI MDM: CPT

## 2022-11-26 PROCEDURE — 80053 COMPREHEN METABOLIC PANEL: CPT

## 2022-11-26 PROCEDURE — 85027 COMPLETE CBC AUTOMATED: CPT

## 2022-11-26 PROCEDURE — 2580000003 HC RX 258

## 2022-11-26 RX ORDER — SODIUM CHLORIDE 0.9 % (FLUSH) 0.9 %
SYRINGE (ML) INJECTION
Status: COMPLETED
Start: 2022-11-26 | End: 2022-11-26

## 2022-11-26 RX ORDER — IBUPROFEN 600 MG/1
600 TABLET ORAL ONCE
Status: COMPLETED | OUTPATIENT
Start: 2022-11-26 | End: 2022-11-26

## 2022-11-26 RX ADMIN — SODIUM CHLORIDE, PRESERVATIVE FREE 10 ML: 5 INJECTION INTRAVENOUS at 08:52

## 2022-11-26 RX ADMIN — IBUPROFEN 600 MG: 600 TABLET, FILM COATED ORAL at 09:53

## 2022-11-26 ASSESSMENT — ENCOUNTER SYMPTOMS
DIARRHEA: 0
BLOOD IN STOOL: 0
WHEEZING: 0
CHEST TIGHTNESS: 0
CONSTIPATION: 0
NAUSEA: 0
VOMITING: 0
BACK PAIN: 0
ABDOMINAL PAIN: 0
COUGH: 0
SHORTNESS OF BREATH: 0

## 2022-11-26 ASSESSMENT — PAIN - FUNCTIONAL ASSESSMENT
PAIN_FUNCTIONAL_ASSESSMENT: NONE - DENIES PAIN
PAIN_FUNCTIONAL_ASSESSMENT: NONE - DENIES PAIN

## 2022-11-26 ASSESSMENT — LIFESTYLE VARIABLES: HOW OFTEN DO YOU HAVE A DRINK CONTAINING ALCOHOL: NEVER

## 2022-11-26 NOTE — ED PROVIDER NOTES
[address]      Pt Name: Uri Smith  MRN: [id number]  [place] 1970  Date of evaluation: 11/26/2022      CHIEF COMPLAINT       Chief Complaint   Patient presents with    Fatigue     Weakness fatigue for the past several days, + covid. No pain        HPI  Uri Smith is a 47 y.o. female  with PMHx of MS (wheelchair-bound), recent COVID presents nursing facility with fatigue and generalized body aches. Patient is denying any current pain but states for over the past couple days she has been more fatigued and weak. She endorses poor appetite and states that her body has been aching but denies any localized pain. .  Patient states that weakness is also generalized and nonlocalized. Describes symptoms moderate in severity with no alleviating or exacerbating factors. Denies any fever, chills, n/v, headache, dizziness, vision changes, neck tenderness or stiffness, weakness, cp, palpitations, leg swelling/tenderness, sob, cough, abd pain, dysuria, hematuria, diarrhea, constipation. Except as noted above the remainder of the review of systems was reviewed and negative. Review of Systems   Constitutional:  Positive for fatigue. Negative for activity change, appetite change, chills and fever. HENT:  Negative for congestion, nosebleeds and tinnitus. Eyes:  Negative for visual disturbance. Respiratory:  Negative for cough, chest tightness, shortness of breath and wheezing. Cardiovascular:  Negative for chest pain, palpitations and leg swelling. Gastrointestinal:  Negative for abdominal pain, blood in stool, constipation, diarrhea, nausea and vomiting. Endocrine: Negative for polydipsia and polyphagia. Genitourinary:  Negative for dysuria, flank pain, hematuria, vaginal bleeding, vaginal discharge and vaginal pain. Musculoskeletal:  Negative for back pain, gait problem, joint swelling and myalgias.    Skin: Negative for rash. Allergic/Immunologic: Negative for immunocompromised state. Neurological:  Negative for dizziness, syncope, weakness, numbness and headaches. Hematological:  Negative for adenopathy. Psychiatric/Behavioral:  Negative for behavioral problems, confusion and hallucinations. Physical Exam  Constitutional:       General: She is not in acute distress. Appearance: Normal appearance. She is normal weight. She is not ill-appearing, toxic-appearing or diaphoretic. HENT:      Head: Normocephalic and atraumatic. Right Ear: External ear normal.      Left Ear: External ear normal.      Nose: Nose normal. No congestion or rhinorrhea. Mouth/Throat:      Mouth: Mucous membranes are moist.      Pharynx: Oropharynx is clear. No oropharyngeal exudate or posterior oropharyngeal erythema. Eyes:      Conjunctiva/sclera: Conjunctivae normal.      Pupils: Pupils are equal, round, and reactive to light. Cardiovascular:      Rate and Rhythm: Normal rate and regular rhythm. Pulses: Normal pulses. Heart sounds: Normal heart sounds. Pulmonary:      Effort: Pulmonary effort is normal. No respiratory distress. Breath sounds: Normal breath sounds. No stridor. No wheezing, rhonchi or rales. Abdominal:      General: Abdomen is flat. Bowel sounds are normal. There is no distension. Palpations: Abdomen is soft. There is no mass. Tenderness: There is no abdominal tenderness. There is no right CVA tenderness, left CVA tenderness or guarding. Hernia: No hernia is present. Musculoskeletal:      Cervical back: Normal range of motion. Skin:     General: Skin is warm and dry. Capillary Refill: Capillary refill takes less than 2 seconds. Neurological:      General: No focal deficit present. Mental Status: She is alert and oriented to person, place, and time. Mental status is at baseline.    Psychiatric:         Mood and Affect: Mood normal.         Behavior: Behavior normal.         Thought Content: Thought content normal.        Procedures     MDM        47 y.o. female  with PMHx of MS (wheelchair-bound), recent COVID presents nursing facility with fatigue and generalized body aches. While in the ED patient was hemodynamically stable, afebrile, nontoxic-appearing, in no respiratory distress. Labs remarkable for troponin with a delta troponin trending down, negative dimer. EKG normal sinus with no sign of acute ischemia. CXR negative for any acute pathologies. Patient received Motrin with significant symptomatic relief. Patient was advised to continued symptomatic treatment for COVID infection, including ibuprofen and Motrin as needed. Patient feels comfortable going home and will follow up outpatient with PCP. Patient understands to return to the ED in case of worsening symptoms. ED Course as of 11/29/22 1053   Sat Nov 26, 2022   0909 EKG: This EKG is signed and interpreted by me, Dr. Leopold Dickinson, MD    Rate: 89  Rhythm: Sinus  Interpretation: Normal sinus rhythm, normal LA interval, normal QRS, normal axis, normal QT interval, no acute ST or T wave changes  Comparison: no previous EKG available   [JA]   3414 Patient is a 62-year-old female presenting from the [facility] for recent diagnosis of COVID-19. States she had a 5-day history of cough, weakness, and body aches. PHYSICAL EXAM:  Vitals Reviewed  Constitutional/General: Alert and oriented x3, well appearing, non toxic in NAD  Head: Normocephalic and atraumatic  Eyes: EOMI  Mouth: Oropharynx clear, handling secretions, no trismus  Neck: Supple, full ROM, no nuchal rigidity or meningeal signs  Pulmonary: Lungs clear to auscultation bilaterally, no wheezes, rales, or rhonchi. Not in respiratory distress  Cardiovascular:  Regular rate and rhythm, no murmurs, gallops, or rubs. 2+ distal pulses  Abdomen: Soft, non tender, non distended,   Extremities: Moves all extremities x 4.  Warm and well perfused  Skin: warm and dry without rash  Neurologic: GCS 15, no focal motor or sensory deficits  Psych: Normal Affect   [GISELL]      ED Course User Index  [GISELL] Emma Abdi MD       --------------------------------------------- PAST HISTORY ---------------------------------------------  Past Medical History:  has a past medical history of Multiple sclerosis ([facility] [place] [address] Past Surgical History:  has a past surgical history that includes baclofen pump implantation (01/2017). Social History:  reports that she has never smoked. She has never used smokeless tobacco. She reports that she does not drink alcohol and does not use drugs. Family History: family history is not on file. The patients home medications have been reviewed. Allergies: Patient has no known allergies.     -------------------------------------------------- RESULTS -------------------------------------------------  Labs:  Results for orders placed or performed during the hospital encounter of 11/26/22   CBC   Result Value Ref Range    WBC 6.9 4.5 - 11.5 E9/L    RBC 5.05 3.50 - 5.50 E12/L    Hemoglobin 14.8 11.5 - 15.5 g/dL    Hematocrit 46.3 34.0 - 48.0 %    MCV 91.7 80.0 - 99.9 fL    MCH 29.3 26.0 - 35.0 pg    MCHC 32.0 32.0 - 34.5 %    RDW 14.8 11.5 - 15.0 fL    Platelets 469 554 - 316 E9/L    MPV 10.8 7.0 - 12.0 fL   Comprehensive Metabolic Panel   Result Value Ref Range    Sodium 140 132 - 146 mmol/L    Potassium 4.2 3.5 - 5.0 mmol/L    Chloride 102 98 - 107 mmol/L    CO2 28 22 - 29 mmol/L    Anion Gap 10 7 - 16 mmol/L    Glucose 95 74 - 99 mg/dL    BUN 14 6 - 20 mg/dL    Creatinine 0.6 0.5 - 1.0 mg/dL    Est, Glom Filt Rate >60 >=60 mL/min/1.73    Calcium 9.4 8.6 - 10.2 mg/dL    Total Protein 6.6 6.4 - 8.3 g/dL    Albumin 4.2 3.5 - 5.2 g/dL    Total Bilirubin 0.5 0.0 - 1.2 mg/dL    Alkaline Phosphatase 142 (H) 35 - 104 U/L    ALT 36 (H) 0 - 32 U/L    AST 31 0 - 31 U/L   Troponin   Result Value Ref Range    Troponin, High Sensitivity 31 (H) 0 - 9 ng/L   D-Dimer, Quantitative   Result Value Ref Range    D-Dimer, Quant <200 ng/mL DDU   Troponin   Result Value Ref Range    Troponin, High Sensitivity 27 (H) 0 - 9 ng/L   EKG 12 Lead   Result Value Ref Range    Ventricular Rate 89 BPM    Atrial Rate 89 BPM    P-R Interval 182 ms    QRS Duration 84 ms    Q-T Interval 376 ms    QTc Calculation (Bazett) 457 ms    P Axis 23 degrees    R Axis -8 degrees    T Axis 27 degrees       Radiology:  XR CHEST PORTABLE   Final Result   No acute process. ------------------------- NURSING NOTES AND VITALS REVIEWED ---------------------------  Date / Time Roomed:  11/26/2022  7:59 AM  ED Bed Assignment:  26/26    The nursing notes within the ED encounter and vital signs as below have been reviewed. BP (!) 106/53   Pulse 95   Temp 98.1 °F (36.7 °C) (Oral)   Resp 18   Ht 5' (1.524 m)   Wt 160 lb (72.6 kg)   LMP 07/19/2017   SpO2 96%   BMI 31.25 kg/m²   Oxygen Saturation Interpretation: Normal      ------------------------------------------ PROGRESS NOTES ------------------------------------------  10:53 AM EST  I have spoken with the patient and discussed todays results, in addition to providing specific details for the plan of care and counseling regarding the diagnosis and prognosis. Their questions are answered at this time and they are agreeable with the plan. I discussed at length with them reasons for immediate return here for re evaluation. They will followup with their primary care physician by calling their office tomorrow. --------------------------------- ADDITIONAL PROVIDER NOTES ---------------------------------  At this time the patient is without objective evidence of an acute process requiring hospitalization or inpatient management. They have remained hemodynamically stable throughout their entire ED visit and are stable for discharge with outpatient follow-up.      The plan has been discussed in detail and they are aware of the specific conditions for emergent return, as well as the importance of follow-up. Discharge Medication List as of 11/26/2022  1:33 PM          Diagnosis:  1. Generalized weakness    2. COVID        Disposition:  Patient's disposition: Discharge to home  Patient's condition is stable.          Sylvain Guadalupe MD  Resident  11/29/22 5524

## 2023-01-12 ENCOUNTER — HOSPITAL ENCOUNTER (OUTPATIENT)
Dept: INFUSION THERAPY | Age: 53
Setting detail: INFUSION SERIES
End: 2023-01-12

## 2023-11-01 ENCOUNTER — OFFICE VISIT (OUTPATIENT)
Dept: PAIN MEDICINE | Facility: HOSPITAL | Age: 53
End: 2023-11-01
Payer: COMMERCIAL

## 2023-11-01 ENCOUNTER — HOSPITAL ENCOUNTER (OUTPATIENT)
Facility: HOSPITAL | Age: 53
Setting detail: OUTPATIENT SURGERY
End: 2023-11-01
Attending: ANESTHESIOLOGY | Admitting: ANESTHESIOLOGY
Payer: COMMERCIAL

## 2023-11-01 DIAGNOSIS — R25.2 SPASTICITY: ICD-10-CM

## 2023-11-01 DIAGNOSIS — Z97.8 PRESENCE OF INTRATHECAL PUMP: ICD-10-CM

## 2023-11-01 DIAGNOSIS — M96.1 POSTLAMINECTOMY SYNDROME OF THORACIC REGION: ICD-10-CM

## 2023-11-01 DIAGNOSIS — G35 MULTIPLE SCLEROSIS (MULTI): Primary | ICD-10-CM

## 2023-11-01 PROBLEM — E11.42 DIABETIC POLYNEUROPATHY ASSOCIATED WITH TYPE 2 DIABETES MELLITUS (MULTI): Status: ACTIVE | Noted: 2023-11-01

## 2023-11-01 PROCEDURE — 99204 OFFICE O/P NEW MOD 45 MIN: CPT | Performed by: ANESTHESIOLOGY

## 2023-11-01 PROCEDURE — 99214 OFFICE O/P EST MOD 30 MIN: CPT | Performed by: ANESTHESIOLOGY

## 2023-11-01 RX ORDER — OFATUMUMAB 20 MG/.4ML
INJECTION, SOLUTION SUBCUTANEOUS
COMMUNITY
Start: 2023-10-24

## 2023-11-01 RX ORDER — FLUOXETINE HYDROCHLORIDE 20 MG/1
CAPSULE ORAL
COMMUNITY
Start: 2023-10-27

## 2023-11-01 RX ORDER — ERGOCALCIFEROL 1.25 MG/1
CAPSULE ORAL
COMMUNITY
Start: 2023-08-15

## 2023-11-01 RX ORDER — LORATADINE 10 MG/1
TABLET ORAL
COMMUNITY
Start: 2023-10-27

## 2023-11-01 RX ORDER — BISACODYL 5 MG
TABLET, DELAYED RELEASE (ENTERIC COATED) ORAL
COMMUNITY
Start: 2023-10-27

## 2023-11-01 NOTE — PROGRESS NOTES
Subjective   Patient ID: Emily Hernandez is a 53 y.o. female.    HPI  Emily Hernandez is a 53-year-old female presenting as a new patient in our clinic today. Patient has history of multiple sclerosis diagnosed at age 26 complicated by significant left lower extremity spasticity. She is status post intrathecal drug delivery system placement 10 years ago; patient is unsure of placement location. Her multiple sclerosis is currently be managed by Dr. Crockett in [place] and her pump refills are done by Sara Wallace NP. She has a SynchroMedII 40 mL pump with baclofen as the primary medication.  The pump is nearing its end of service date of January 23, 2024. Patient states that she used to ambulate well with pump for the last year with a cane, but is now wheelchair-bound, and the pump has reached end-of-life surface. She denies any numbness or tingling in lower extremity. She denies any red flag symptoms.  Review of Systems   All other systems reviewed and are negative.      Objective   Physical Exam  Patient is wheelchair-bound and unable to perform a full physical exam.    PHYSICAL EXAM  Vitals signs reviewed  Constitutional:       General: Not in acute distress     Appearance: Normal appearance. Not ill-appearing.  HENT:     Head: Normocephalic and atraumatic  Eyes:     Conjunctiva/sclera: Conjunctivae normal  Cardiovascular:     Rate and Rhythm: Normal rate and regular rhythm  Pulmonary:     Effort: No respiratory distress  Abdominal:     Palpations: Abdomen is soft  Musculoskeletal: Modified Ju[place] scale score of 4 on the left lower extremity (unable to flex knee fully extended extremity). Modified [place] scale score of 2 with a right lower extremity.  Skin:     General: Skin is warm and dry  Neurological:     General: Normal sensation to light touch and pinprick in the bilateral lower extremities.  Psychiatric:         Mood and Affect: Mood normal         Behavior: Behavior normal      Assessment/Plan    Diagnoses and all orders for this visit:  Multiple sclerosis (CMS/[facility])  -     Case Request Operating Room: Intrathecal Pump Insertion Permanent, Insertion Stimulator Spine  Postlaminectomy syndrome of thoracic region  Spasticity  -     Case Request Operating Room: Intrathecal Pump Insertion Permanent, Insertion Stimulator Spine  Presence of intrathecal pump     Emily Guy is a 53-year-old female with a history of multiple sclerosis with spasticity of the left lower extremity who presents as a new patient in our clinic today. Patient has a SynchroMed II baclofen pump that is nearing the end of service date which BRENDA is January 23, 2024. Patient had the pump placed 10 years ago and is receiving refills from Sara Wallace NP at [facility], and her multiple sclerosis is being managed by Dr. Crockett in [place]. We discussed with the patient the process of replacement of her pump, which we will take care of for her because Dr. Broderick is on a leave. We discussed at length the preoperative intraoperative and postoperative management for the pump replacement, including minimizing the risk for infection and withdrawal from baclofen which may be life-threatening, and she is understanding and wishes to proceed.    Pump information:  SynchroMed II  Pump volume: 40mL  Primary drug: Baclofen 2,000 mcg/mL    24hr dose: 384.8 mcg  Base rate: 12.8 mcg/hr  Bolus: none  myPTM: none    Current reservoir: 6.2 mL  Refill pump before: 11/22/23  End of service: 1/23/24

## 2023-11-03 ENCOUNTER — APPOINTMENT (OUTPATIENT)
Dept: NEUROSURGERY | Facility: CLINIC | Age: 53
End: 2023-11-03
Payer: COMMERCIAL

## 2023-12-06 ENCOUNTER — PREP FOR PROCEDURE (OUTPATIENT)
Dept: PAIN MEDICINE | Facility: HOSPITAL | Age: 53
End: 2023-12-06
Payer: COMMERCIAL

## 2023-12-06 DIAGNOSIS — R25.2 SPASTICITY: Primary | ICD-10-CM

## 2023-12-27 ENCOUNTER — PREP FOR PROCEDURE (OUTPATIENT)
Dept: PAIN MEDICINE | Facility: HOSPITAL | Age: 53
End: 2023-12-27
Payer: COMMERCIAL

## 2023-12-27 DIAGNOSIS — E11.42 DIABETIC POLYNEUROPATHY ASSOCIATED WITH TYPE 2 DIABETES MELLITUS (MULTI): ICD-10-CM

## 2023-12-27 DIAGNOSIS — G35 MULTIPLE SCLEROSIS (MULTI): Primary | ICD-10-CM

## 2023-12-27 DIAGNOSIS — M96.1 POSTLAMINECTOMY SYNDROME OF THORACIC REGION: ICD-10-CM

## 2023-12-27 DIAGNOSIS — R25.2 SPASTICITY: ICD-10-CM

## 2024-01-02 ENCOUNTER — TELEPHONE (OUTPATIENT)
Dept: PAIN MEDICINE | Facility: HOSPITAL | Age: 54
End: 2024-01-02
Payer: COMMERCIAL

## 2024-01-02 NOTE — TELEPHONE ENCOUNTER
Attempted to call patient to review surgery details - ITP replacement with Dr. Montague. Scheduled for 1/11/24 at [facility]

## 2024-01-04 ENCOUNTER — TELEPHONE (OUTPATIENT)
Dept: PAIN MEDICINE | Facility: HOSPITAL | Age: 54
End: 2024-01-04
Payer: COMMERCIAL

## 2024-01-04 NOTE — TELEPHONE ENCOUNTER
Spoke to sister, Gabrielle. She will be present for Ms. Hernandez's surgery next week at [facility]. Confirmed details with her. She has my direct # if needed

## 2024-01-04 NOTE — TELEPHONE ENCOUNTER
Spoke to ITP nurse, Jessica Montilla, about coordinating pt's ITP replacement at [facility] main on 1/11 with Dr. Montague. Per Jessica, pt lives at [facility] contact is Jen [phone]    Called Jen to confirm details of Ms. Hernandez's surgery. Per Jen, pt consents for self but has POA on file, Gabrielle (sister). Also per Jen, pt is not on AC therapy.    Left message for Gabrielle olmos my call back # to confirm details. Gabrielle's contact added to pt chart.

## 2024-01-10 ENCOUNTER — ANESTHESIA EVENT (OUTPATIENT)
Dept: OPERATING ROOM | Facility: HOSPITAL | Age: 54
End: 2024-01-10
Payer: COMMERCIAL

## 2024-01-11 ENCOUNTER — HOSPITAL ENCOUNTER (OUTPATIENT)
Facility: HOSPITAL | Age: 54
Setting detail: OUTPATIENT SURGERY
Discharge: HOME | End: 2024-01-11
Attending: ANESTHESIOLOGY | Admitting: ANESTHESIOLOGY
Payer: COMMERCIAL

## 2024-01-11 ENCOUNTER — DOCUMENTATION (OUTPATIENT)
Dept: PAIN MEDICINE | Facility: HOSPITAL | Age: 54
End: 2024-01-11

## 2024-01-11 ENCOUNTER — ANESTHESIA (OUTPATIENT)
Dept: OPERATING ROOM | Facility: HOSPITAL | Age: 54
End: 2024-01-11
Payer: COMMERCIAL

## 2024-01-11 VITALS
WEIGHT: 187 LBS | BODY MASS INDEX: 37.7 KG/M2 | HEIGHT: 59 IN | DIASTOLIC BLOOD PRESSURE: 77 MMHG | SYSTOLIC BLOOD PRESSURE: 132 MMHG | RESPIRATION RATE: 20 BRPM | HEART RATE: 78 BPM | OXYGEN SATURATION: 97 % | TEMPERATURE: 97.9 F

## 2024-01-11 DIAGNOSIS — Z97.8 PRESENCE OF INTRATHECAL PUMP: Primary | ICD-10-CM

## 2024-01-11 DIAGNOSIS — R25.2 SPASTICITY: ICD-10-CM

## 2024-01-11 DIAGNOSIS — Z97.8 PRESENCE OF INTRATHECAL PUMP: ICD-10-CM

## 2024-01-11 DIAGNOSIS — G35 MULTIPLE SCLEROSIS (MULTI): Primary | ICD-10-CM

## 2024-01-11 PROCEDURE — A62362 PR INSERT/ REPLACE INFUSN PUMP,PROGRAMMABLE: Performed by: STUDENT IN AN ORGANIZED HEALTH CARE EDUCATION/TRAINING PROGRAM

## 2024-01-11 PROCEDURE — 2500000004 HC RX 250 GENERAL PHARMACY W/ HCPCS (ALT 636 FOR OP/ED): Performed by: NURSE ANESTHETIST, CERTIFIED REGISTERED

## 2024-01-11 PROCEDURE — 2500000005 HC RX 250 GENERAL PHARMACY W/O HCPCS: Performed by: ANESTHESIOLOGY

## 2024-01-11 PROCEDURE — A4217 STERILE WATER/SALINE, 500 ML: HCPCS | Performed by: ANESTHESIOLOGY

## 2024-01-11 PROCEDURE — 3700000002 HC GENERAL ANESTHESIA TIME - EACH INCREMENTAL 1 MINUTE: Performed by: ANESTHESIOLOGY

## 2024-01-11 PROCEDURE — 2720000007 HC OR 272 NO HCPCS: Performed by: ANESTHESIOLOGY

## 2024-01-11 PROCEDURE — 7100000010 HC PHASE TWO TIME - EACH INCREMENTAL 1 MINUTE: Performed by: ANESTHESIOLOGY

## 2024-01-11 PROCEDURE — 3600000007 HC OR TIME - EACH INCREMENTAL 1 MINUTE - PROCEDURE LEVEL TWO: Performed by: ANESTHESIOLOGY

## 2024-01-11 PROCEDURE — 88300 SURGICAL PATH GROSS: CPT | Mod: TC,SUR | Performed by: ANESTHESIOLOGY

## 2024-01-11 PROCEDURE — 62362 IMPLANT SPINE INFUSION PUMP: CPT | Performed by: ANESTHESIOLOGY

## 2024-01-11 PROCEDURE — C1772 INFUSION PUMP, PROGRAMMABLE: HCPCS | Performed by: ANESTHESIOLOGY

## 2024-01-11 PROCEDURE — 3600000002 HC OR TIME - INITIAL BASE CHARGE - PROCEDURE LEVEL TWO: Performed by: ANESTHESIOLOGY

## 2024-01-11 PROCEDURE — 2780000003 HC OR 278 NO HCPCS: Performed by: ANESTHESIOLOGY

## 2024-01-11 PROCEDURE — 3700000001 HC GENERAL ANESTHESIA TIME - INITIAL BASE CHARGE: Performed by: ANESTHESIOLOGY

## 2024-01-11 PROCEDURE — 88300 SURGICAL PATH GROSS: CPT | Performed by: PATHOLOGY

## 2024-01-11 PROCEDURE — A62362 PR INSERT/ REPLACE INFUSN PUMP,PROGRAMMABLE: Performed by: NURSE ANESTHETIST, CERTIFIED REGISTERED

## 2024-01-11 PROCEDURE — 2500000001 HC RX 250 WO HCPCS SELF ADMINISTERED DRUGS (ALT 637 FOR MEDICARE OP): Performed by: ANESTHESIOLOGY

## 2024-01-11 PROCEDURE — 2500000004 HC RX 250 GENERAL PHARMACY W/ HCPCS (ALT 636 FOR OP/ED): Performed by: ANESTHESIOLOGY

## 2024-01-11 PROCEDURE — 2500000005 HC RX 250 GENERAL PHARMACY W/O HCPCS: Performed by: NURSE ANESTHETIST, CERTIFIED REGISTERED

## 2024-01-11 PROCEDURE — 62370 ANL SP INF PMP W/MDREPRG&FIL: CPT | Performed by: ANESTHESIOLOGY

## 2024-01-11 PROCEDURE — 7100000009 HC PHASE TWO TIME - INITIAL BASE CHARGE: Performed by: ANESTHESIOLOGY

## 2024-01-11 DEVICE — INFUSION PUMP, SYNCHROMED II, 40ML: Type: IMPLANTABLE DEVICE | Site: ABDOMEN | Status: FUNCTIONAL

## 2024-01-11 RX ORDER — MIDAZOLAM HYDROCHLORIDE 1 MG/ML
INJECTION INTRAMUSCULAR; INTRAVENOUS AS NEEDED
Status: DISCONTINUED | OUTPATIENT
Start: 2024-01-11 | End: 2024-01-11

## 2024-01-11 RX ORDER — HYDROMORPHONE HYDROCHLORIDE 1 MG/ML
0.2 INJECTION, SOLUTION INTRAMUSCULAR; INTRAVENOUS; SUBCUTANEOUS EVERY 5 MIN PRN
Status: DISCONTINUED | OUTPATIENT
Start: 2024-01-11 | End: 2024-01-11 | Stop reason: HOSPADM

## 2024-01-11 RX ORDER — FENTANYL CITRATE 50 UG/ML
INJECTION, SOLUTION INTRAMUSCULAR; INTRAVENOUS AS NEEDED
Status: DISCONTINUED | OUTPATIENT
Start: 2024-01-11 | End: 2024-01-11

## 2024-01-11 RX ORDER — BACITRACIN 500 [USP'U]/G
OINTMENT TOPICAL
Status: DISCONTINUED
Start: 2024-01-11 | End: 2024-01-11 | Stop reason: HOSPADM

## 2024-01-11 RX ORDER — BACLOFEN 10 MG/1
TABLET ORAL 3 TIMES DAILY
COMMUNITY

## 2024-01-11 RX ORDER — SODIUM CHLORIDE, SODIUM LACTATE, POTASSIUM CHLORIDE, CALCIUM CHLORIDE 600; 310; 30; 20 MG/100ML; MG/100ML; MG/100ML; MG/100ML
INJECTION, SOLUTION INTRAVENOUS CONTINUOUS PRN
Status: DISCONTINUED | OUTPATIENT
Start: 2024-01-11 | End: 2024-01-11

## 2024-01-11 RX ORDER — CEFAZOLIN 1 G/1
INJECTION, POWDER, FOR SOLUTION INTRAVENOUS AS NEEDED
Status: DISCONTINUED | OUTPATIENT
Start: 2024-01-11 | End: 2024-01-11

## 2024-01-11 RX ORDER — SODIUM CHLORIDE, SODIUM LACTATE, POTASSIUM CHLORIDE, CALCIUM CHLORIDE 600; 310; 30; 20 MG/100ML; MG/100ML; MG/100ML; MG/100ML
100 INJECTION, SOLUTION INTRAVENOUS CONTINUOUS
Status: DISCONTINUED | OUTPATIENT
Start: 2024-01-11 | End: 2024-01-11 | Stop reason: HOSPADM

## 2024-01-11 RX ORDER — VANCOMYCIN HYDROCHLORIDE 1 G/200ML
1000 INJECTION, SOLUTION INTRAVENOUS ONCE
Status: COMPLETED | OUTPATIENT
Start: 2024-01-11 | End: 2024-01-11

## 2024-01-11 RX ORDER — VANCOMYCIN HYDROCHLORIDE 1 G/20ML
1 INJECTION, POWDER, LYOPHILIZED, FOR SOLUTION INTRAVENOUS ONCE
Status: DISCONTINUED | OUTPATIENT
Start: 2024-01-11 | End: 2024-01-11

## 2024-01-11 RX ORDER — OXYCODONE HYDROCHLORIDE 5 MG/1
5 TABLET ORAL EVERY 4 HOURS PRN
Status: DISCONTINUED | OUTPATIENT
Start: 2024-01-11 | End: 2024-01-11 | Stop reason: HOSPADM

## 2024-01-11 RX ORDER — ALBUTEROL SULFATE 0.83 MG/ML
2.5 SOLUTION RESPIRATORY (INHALATION) ONCE AS NEEDED
Status: DISCONTINUED | OUTPATIENT
Start: 2024-01-11 | End: 2024-01-11 | Stop reason: HOSPADM

## 2024-01-11 RX ORDER — ONDANSETRON HYDROCHLORIDE 2 MG/ML
4 INJECTION, SOLUTION INTRAVENOUS ONCE AS NEEDED
Status: DISCONTINUED | OUTPATIENT
Start: 2024-01-11 | End: 2024-01-11 | Stop reason: HOSPADM

## 2024-01-11 RX ORDER — OXYCODONE HYDROCHLORIDE 5 MG/1
10 TABLET ORAL EVERY 4 HOURS PRN
Status: DISCONTINUED | OUTPATIENT
Start: 2024-01-11 | End: 2024-01-11 | Stop reason: HOSPADM

## 2024-01-11 RX ORDER — HYDROMORPHONE HYDROCHLORIDE 1 MG/ML
0.5 INJECTION, SOLUTION INTRAMUSCULAR; INTRAVENOUS; SUBCUTANEOUS EVERY 5 MIN PRN
Status: DISCONTINUED | OUTPATIENT
Start: 2024-01-11 | End: 2024-01-11 | Stop reason: HOSPADM

## 2024-01-11 RX ORDER — VANCOMYCIN HYDROCHLORIDE 1 G/20ML
INJECTION, POWDER, LYOPHILIZED, FOR SOLUTION INTRAVENOUS
Status: DISCONTINUED
Start: 2024-01-11 | End: 2024-01-11 | Stop reason: HOSPADM

## 2024-01-11 RX ORDER — VANCOMYCIN HYDROCHLORIDE 1 G/200ML
INJECTION, SOLUTION INTRAVENOUS
Status: DISCONTINUED
Start: 2024-01-11 | End: 2024-01-11 | Stop reason: HOSPADM

## 2024-01-11 RX ORDER — SODIUM CHLORIDE 0.9 G/100ML
IRRIGANT IRRIGATION AS NEEDED
Status: DISCONTINUED | OUTPATIENT
Start: 2024-01-11 | End: 2024-01-11 | Stop reason: HOSPADM

## 2024-01-11 RX ORDER — WATER 1 ML/ML
IRRIGANT IRRIGATION AS NEEDED
Status: DISCONTINUED | OUTPATIENT
Start: 2024-01-11 | End: 2024-01-11 | Stop reason: HOSPADM

## 2024-01-11 RX ORDER — ACETAMINOPHEN 325 MG/1
650 TABLET ORAL EVERY 4 HOURS PRN
Status: DISCONTINUED | OUTPATIENT
Start: 2024-01-11 | End: 2024-01-11 | Stop reason: HOSPADM

## 2024-01-11 RX ORDER — LIDOCAINE HYDROCHLORIDE 20 MG/ML
INJECTION, SOLUTION INFILTRATION; PERINEURAL AS NEEDED
Status: DISCONTINUED | OUTPATIENT
Start: 2024-01-11 | End: 2024-01-11

## 2024-01-11 RX ORDER — PROPOFOL 10 MG/ML
INJECTION, EMULSION INTRAVENOUS CONTINUOUS PRN
Status: DISCONTINUED | OUTPATIENT
Start: 2024-01-11 | End: 2024-01-11

## 2024-01-11 RX ADMIN — PROPOFOL 50 MCG/KG/MIN: 10 INJECTION, EMULSION INTRAVENOUS at 12:54

## 2024-01-11 RX ADMIN — MIDAZOLAM HYDROCHLORIDE 1 MG: 1 INJECTION, SOLUTION INTRAMUSCULAR; INTRAVENOUS at 12:52

## 2024-01-11 RX ADMIN — CEFAZOLIN 2 G: 330 INJECTION, POWDER, FOR SOLUTION INTRAMUSCULAR; INTRAVENOUS at 12:59

## 2024-01-11 RX ADMIN — SODIUM CHLORIDE, POTASSIUM CHLORIDE, SODIUM LACTATE AND CALCIUM CHLORIDE: 600; 310; 30; 20 INJECTION, SOLUTION INTRAVENOUS at 12:23

## 2024-01-11 RX ADMIN — MIDAZOLAM HYDROCHLORIDE 0.5 MG: 1 INJECTION, SOLUTION INTRAMUSCULAR; INTRAVENOUS at 13:59

## 2024-01-11 RX ADMIN — LIDOCAINE HYDROCHLORIDE 40 MG: 20 INJECTION, SOLUTION INFILTRATION; PERINEURAL at 12:55

## 2024-01-11 RX ADMIN — FENTANYL CITRATE 25 MCG: 50 INJECTION, SOLUTION INTRAMUSCULAR; INTRAVENOUS at 12:54

## 2024-01-11 RX ADMIN — VANCOMYCIN HYDROCHLORIDE 1000 MG: 1 INJECTION, SOLUTION INTRAVENOUS at 11:37

## 2024-01-11 ASSESSMENT — PAIN - FUNCTIONAL ASSESSMENT
PAIN_FUNCTIONAL_ASSESSMENT: 0-10

## 2024-01-11 ASSESSMENT — PAIN SCALES - GENERAL
PAINLEVEL_OUTOF10: 0 - NO PAIN
PAIN_LEVEL: 0
PAINLEVEL_OUTOF10: 0 - NO PAIN
PAINLEVEL_OUTOF10: 0 - NO PAIN

## 2024-01-11 ASSESSMENT — COLUMBIA-SUICIDE SEVERITY RATING SCALE - C-SSRS
1. IN THE PAST MONTH, HAVE YOU WISHED YOU WERE DEAD OR WISHED YOU COULD GO TO SLEEP AND NOT WAKE UP?: NO
2. HAVE YOU ACTUALLY HAD ANY THOUGHTS OF KILLING YOURSELF?: NO
6. HAVE YOU EVER DONE ANYTHING, STARTED TO DO ANYTHING, OR PREPARED TO DO ANYTHING TO END YOUR LIFE?: NO

## 2024-01-11 NOTE — OP NOTE
Intrathecal Pump Insertion Permanent Operative Note     Date: 2024  OR Location: [facility] OR    Name: Emily Hernandez : 1970, Age: 53 y.o., MRN: [id number], Sex: female    Diagnosis  Pre-op Diagnosis     * Multiple sclerosis (CMS/HCC) [G35]     * Spasticity [R25.2] Post-op Diagnosis     * Multiple sclerosis (CMS/HCC) [G35]     * Spasticity [R25.2]     Procedures  Intrathecal Pump Insertion Permanent  02813 - CO IMPLTJ/RPLCMT ITHCL/EDRL DRUG NFS PRGRBL PUMP      Surgeons      * Bertha Montague - Primary    Resident/Fellow/Other Assistant:  Surgeon(s) and Role:    Procedure Summary  Anesthesia: Monitor Anesthesia Care  ASA: III  Anesthesia Staff: Anesthesiologist: René Sr DO  CRNA: Jo Claudio, APRN-CRNA; Leanna Lozano APRN-CRNA  SRNA: MATHEW Lozano-CRNA  Estimated Blood Loss: 5 mL  Intra-op Medications:   Medication Name Total Dose   neomycin-bacitracnZn-polymyxnB (Neosporin Original) ointment 1 Application   baclofen (Lioresal) 2,000 mcg/mL intrathecal injection 39 mL   polymyxin B 1,000,000 Units, vancomycin (Vancocin) 1,000 mg in sodium chloride 0.9 % 1,000 mL irrigation Cannot be calculated   sterile water irrigation solution 1,000 mL   BUPivacaine HCl (Marcaine) 0.5 % (5 mg/mL) 30 mL, lidocaine-epinephrine (Xylocaine W/EPI) 20 mL syringe 20 mL              Anesthesia Record               Intraprocedure I/O Totals          Intake    Propofol Drip 0.00 mL    The total shown is the total volume documented since Anesthesia Start was filed.    LR infusion 400.00 mL    Total Intake 400 mL       Output    Est. Blood Loss 5 mL    Total Output 5 mL       Net    Net Volume 395 mL          Specimen:   ID Type Source Tests Collected by Time   1 : EXPLANTED INTRATHECAL PUMP DEVICE Tissue DEVICE SURGICAL PATHOLOGY EXAM Bertha Montague MD PhD 2024 1327        Staff:   Circulator: Negar Aj RN; Maura Kyle RN  Relief Circulator: Heidi Van RN  Relief Scrub: Yesenia  Melchor, RN  Scrub Person: Jatin Aguillon; Shante Arias, NANETTE         Drains and/or Catheters: * None in log *    Tourniquet Times:         Implants:  Implants       Type Name Action Serial No.      Neuro Interventional Implant INFUSION PUMP, SYNCHROMED II, 40ML - [id number] - [id number] Implanted [id number]              Findings: See operative note    Indications: Emily Hernandez is an 53 y.o. female who is having surgery for replacing her end-of-life intrathecal pump that was placed for managing spasticity secondary to multiple sclerosis.  Multiple sclerosis (CMS/[facility]) [G35]  Spasticity [R25.2].     The patient was seen in the preoperative area. The risks, benefits, complications, treatment options, non-operative alternatives, expected recovery and outcomes were discussed with the patient. The possibilities of reaction to medication, pulmonary aspiration, injury to surrounding structures, bleeding, recurrent infection, the need for additional procedures, failure to diagnose a condition, and creating a complication requiring transfusion or operation were discussed with the patient. The patient concurred with the proposed plan, giving informed consent.  The site of surgery was properly noted/marked if necessary per policy. The patient has been actively warmed in preoperative area. Preoperative antibiotics have been ordered and given within 1 hours of incision. Venous thrombosis prophylaxis have been ordered including bilateral sequential compression devices    Procedure Details: Following informed consent, the patient was brought to the operating room and placed in the supine position.  Appropriate huddle was carried out.  MAC anesthesia was then induced.  1 g of vancomycin had been given preoperatively in the preop holding area.  Following proper chlorhexidine prep and drape, the skin and subcutaneous tissue overlying the old incision was anesthetized with a total of 10 cc of a solution containing 1% lidocaine and  0.5% bupivacaine with 1: 400,000 epinephrine.  #10 blade was used to incise the skin and subcutaneous tissue.  Electrocautery was used to achieve hemostasis.  The pump was then free of its anchors.  A new pump was filled with the same concentration of baclofen 2000 mcg/mL programmed to deliver 358 mcg a day and the same flex dosing.  The new pump was primed and then attached to the catheter and the old pump was removed.  0-Nurolon sutures were used to anchor the pump into the fascia taking care not to encroach upon the catheter.  The wound was then copiously irrigated with the polymyxin and vancomycin containing solution.  The wound was then closed in layered fashion using 0 Vicryl for the deepest layer followed by 2-0 V-Loc for the subcutaneous tissue and 4-0 V-Loc for the subcuticular tissue.  Complications:  None; patient tolerated the procedure well.    Disposition: PACU - hemodynamically stable.  Condition: stable         Additional Details: The patient was provided with written discharge instruction for her follow-up care.  She would like to follow-up with her local physician in the [facility].  Information was sent in detail to her long-term facility.  The patient elected not to follow-up with us in the clinic but we will be updated by her local physician regarding the wound.  The patient was given antibiotic coverage for 1 week including doxycycline 100 mg twice daily.  Attending Attestation: I was present for the entire procedure.    Bertha Montague  Phone Number: [phone]

## 2024-01-11 NOTE — H&P
"HISTORY AND PHYSICAL    History Of Present Illness  Emily Hernandez is a 53 y.o. female presenting with baclofen IDDS in place for spacticity.    Here for baclofen IDDS pump replacement for end of life.   Patient currently living in nursing facility; started vancomycin IV in preop.     she denies any recent antibiotic use or infections, she denies any blood thinner use , and she denies contrast or local anesthetic allergies     Past Medical History  History reviewed. No pertinent past medical history.    Surgical History  History reviewed. No pertinent surgical history.     Social History  She reports that she has never smoked. She has never used smokeless tobacco. She reports that she does not drink alcohol and does not use drugs.    Family History  No family history on file.     Allergies  Patient has no known allergies.    Review of Systems   12 point ROS done and negative except for the above.   Physical Exam     General: NAD, well groomed, well nourished  Eyes: Non-icteric sclera, EOMI  Ears, Nose, Mouth, and Throat: External ears and nose appear to be without deformity or rash. No lesions or masses noted. Hearing is grossly intact.   Neck: Trachea midline  Respiratory: Nonlabored breathing   Cardiovascular: No peripheral edema   Skin: No rashes or open lesions/ulcers identified on skin.  Abdomen: Right sided IDDS pump site well healed, no erythema, fluctuance, or other skin changes.       Last Recorded Vitals  Blood pressure 139/78, pulse 83, temperature 36.3 °C (97.3 °F), temperature source Temporal, resp. rate 18, height 1.499 m (4' 11\"), weight 84.8 kg (187 lb), SpO2 97 %.    Relevant Results        Assessment/Plan   IDDS replacement on the right side of her abdomen.     IDDS interrogated at [employer].   40mL reservoir; 26.7mL residual  Baclofen 2mg/ml  Dose 384.8mcg/d 12.8mcg/hr base rate  20mcg boluses over 2 minutes at 0000, 0600, 1200, 1800  PTM disabled   End of service date 1/23/24    Discussed that she is " at an increased risk of infection and wound breakdown as a sequela of her comorbidities.     Risks, benefits, alternatives discussed. All questions answered to the best of my ability. Patient agrees to proceed.   -We will proceed with planned procedure        Thuan Shaffer MD  Chronic Pain Fellow  [facility]

## 2024-01-11 NOTE — ANESTHESIA PREPROCEDURE EVALUATION
Patient: Emily Hernandez    Procedure Information       Date/Time: 01/11/24 1330    Procedures:       Intrathecal Pump Insertion Permanent      Insertion Stimulator Spine (Right)    Location: [facility] OR 06 / [facility] OR    Surgeons: Bertha Montague MD PhD            Relevant Problems   Endocrine   (+) Diabetic polyneuropathy associated with type 2 diabetes mellitus (CMS/HCC)      Neuro/Psych   (+) Diabetic polyneuropathy associated with type 2 diabetes mellitus (CMS/HCC)   (+) Multiple sclerosis (CMS/HCC)       Clinical information reviewed:   Tobacco  Allergies  Meds   Med Hx  Surg Hx   Fam Hx  Soc Hx        NPO Detail:  NPO/Void Status  Date of Last Liquid: 01/10/24  Time of Last Liquid: 2200  Date of Last Solid: 01/10/24  Time of Last Solid: 1600  Last Intake Type: Clear fluids         PHYSICAL EXAM    Anesthesia Plan    History of general anesthesia?: yes  History of complications of general anesthesia?: no    ASA 3     MAC

## 2024-01-11 NOTE — ANESTHESIA POSTPROCEDURE EVALUATION
Patient: Emily Hernandez    Procedure Summary       Date: 01/11/24 Room / Location: [facility] OR 06 / [facility] OR    Anesthesia Start: 1237 Anesthesia Stop: 1428    Procedure: Intrathecal Pump Insertion Permanent Diagnosis:       Multiple sclerosis (CMS/HCC)      Spasticity      (Multiple sclerosis (CMS/HCC) [G35])      (Spasticity [R25.2])    Surgeons: Bertha Montague MD PhD Responsible Provider: René Sr DO    Anesthesia Type: Not recorded ASA Status: Not recorded            Anesthesia Type: No value filed.    Vitals Value Taken Time   /76 01/11/24 1426   Temp 36.6 °C (97.9 °F) 01/11/24 1426   Pulse 68 01/11/24 1426   Resp 20 01/11/24 1426   SpO2 97 % 01/11/24 1426       Anesthesia Post Evaluation    Patient location during evaluation: bedside  Patient participation: complete - patient participated  Level of consciousness: awake and alert  Pain score: 0  Pain management: adequate  Airway patency: patent  Cardiovascular status: acceptable  Respiratory status: acceptable and room air  Hydration status: acceptable  Postoperative Nausea and Vomiting: none        No notable events documented.

## 2024-01-11 NOTE — DISCHARGE INSTRUCTIONS
Post-surgery instructions:    Antibiotics: We are giving you three antibiotics--two pills and one ointment.   1)Take cephalexin 500mg (1 capsule) by mouth four times per day for one day after surgery.   2) At the same take doxycycline 100mg (one tab) twice a day for seven days to prevent infection.   3) Take mupirocin topical ointment, apply to a q tip and put some in each nostril every morning and every bedtime for three days after surgery.   We gave you a paper prescription for all of these.     Pain Medications: We gave you a paper prescription. You will be getting 5-10 pills for post-surgery pain. You will not be getting a refill, so don't lose them.     Activity: Avoid strenuous activity until the follow up appointment. No lifting over 10 lbs for 24hrs and no lifting over 30 pounds for six weeks. After that return to your normal activity level.     Bandages:  Your facility doctor can do the wound check. Leave the bandages in place for three days; after that expose the area to air. Keep them dry.     Showering/Bathing:     Do not shower until the wound check by your facility doctor, sponge bath only for one week.     For one week use a Hibiclens solution daily to wash the areas we noted to have some inflammation--under your breasts and under your belly.     Do not completely submerge incision (no bath tub, hot tub, pool, lake, etc)  until skin has fully healed over (approximately one month).    Follow up: With Dr. Montague over the phone in one week to discuss how you are doing. Your regular doctor at your facility can do the wound check.   Call Dr. Eddi Canales's nurse ([phone]), to Schedule if this is not already scheduled.      Scheduling for pain management: [phone]    After hours, call the   ([phone]) and ask for the pain management answering service if you notice any of the below:     Call the doctor immediately: if you notice:    Excessive bleeding from procedure site (brisk bright  red bleeding from the site or bleeding that soaks the bandages or does not stop)   Severe headache  Inability to walk, leg or arm weakness or numbness that is significantly worse after the procedure   Uncontrolled pain   Inability to control your bowels or bladder   Signs of infection: Fever above 101.5F, redness, swelling, pus or drainage from the site

## 2024-01-15 LAB
LABORATORY COMMENT REPORT: NORMAL
PATH REPORT.FINAL DX SPEC: NORMAL
PATH REPORT.GROSS SPEC: NORMAL
PATH REPORT.RELEVANT HX SPEC: NORMAL
PATH REPORT.TOTAL CANCER: NORMAL

## 2024-01-17 ENCOUNTER — APPOINTMENT (OUTPATIENT)
Dept: PAIN MEDICINE | Facility: HOSPITAL | Age: 54
End: 2024-01-17
Payer: COMMERCIAL

## 2025-01-17 PROBLEM — F33.40 RECURRENT MAJOR DEPRESSIVE DISORDER, IN REMISSION: Status: ACTIVE | Noted: 2025-01-17

## 2025-01-17 PROBLEM — E55.9 VITAMIN D DEFICIENCY: Status: ACTIVE | Noted: 2025-01-17

## 2025-01-17 PROBLEM — F41.1 GENERALIZED ANXIETY DISORDER: Status: ACTIVE | Noted: 2025-01-17

## 2025-05-08 ENCOUNTER — APPOINTMENT (OUTPATIENT)
Dept: CT IMAGING | Age: 55
End: 2025-05-08
Payer: COMMERCIAL

## 2025-05-08 ENCOUNTER — APPOINTMENT (OUTPATIENT)
Dept: ULTRASOUND IMAGING | Age: 55
End: 2025-05-08
Payer: COMMERCIAL

## 2025-05-08 ENCOUNTER — HOSPITAL ENCOUNTER (EMERGENCY)
Age: 55
Discharge: HOME | End: 2025-05-08
Attending: EMERGENCY MEDICINE
Payer: COMMERCIAL

## 2025-05-08 VITALS
TEMPERATURE: 98.1 F | HEART RATE: 106 BPM | RESPIRATION RATE: 18 BRPM | DIASTOLIC BLOOD PRESSURE: 78 MMHG | SYSTOLIC BLOOD PRESSURE: 129 MMHG | OXYGEN SATURATION: 94 %

## 2025-05-08 DIAGNOSIS — G35 MULTIPLE SCLEROSIS (HCC): ICD-10-CM

## 2025-05-08 DIAGNOSIS — R29.898 WEAKNESS OF BOTH LOWER EXTREMITIES: Primary | ICD-10-CM

## 2025-05-08 LAB
ALBUMIN SERPL-MCNC: 4.2 G/DL (ref 3.5–5.2)
ALP SERPL-CCNC: 117 U/L (ref 35–104)
ALT SERPL-CCNC: 23 U/L (ref 0–35)
ANION GAP SERPL CALCULATED.3IONS-SCNC: 19 MMOL/L (ref 7–16)
AST SERPL-CCNC: 24 U/L (ref 0–35)
BACTERIA URNS QL MICRO: ABNORMAL
BASOPHILS # BLD: 0.05 K/UL (ref 0–0.2)
BASOPHILS NFR BLD: 1 % (ref 0–2)
BILIRUB SERPL-MCNC: 1.1 MG/DL (ref 0–1.2)
BILIRUB UR QL STRIP: ABNORMAL
BUN SERPL-MCNC: 9 MG/DL (ref 6–20)
CALCIUM SERPL-MCNC: 9.6 MG/DL (ref 8.6–10)
CHLORIDE SERPL-SCNC: 97 MMOL/L (ref 98–107)
CLARITY UR: ABNORMAL
CO2 SERPL-SCNC: 22 MMOL/L (ref 22–29)
COLOR UR: YELLOW
CREAT SERPL-MCNC: 0.6 MG/DL (ref 0.5–1)
EOSINOPHIL # BLD: 0.14 K/UL (ref 0.05–0.5)
EOSINOPHILS RELATIVE PERCENT: 1 % (ref 0–6)
EPI CELLS #/AREA URNS HPF: ABNORMAL /HPF
ERYTHROCYTE [DISTWIDTH] IN BLOOD BY AUTOMATED COUNT: 13.2 % (ref 11.5–15)
GFR, ESTIMATED: >90 ML/MIN/1.73M2
GLUCOSE SERPL-MCNC: 104 MG/DL (ref 74–99)
GLUCOSE UR STRIP-MCNC: NEGATIVE MG/DL
HCT VFR BLD AUTO: 45.3 % (ref 34–48)
HGB BLD-MCNC: 14.9 G/DL (ref 11.5–15.5)
HGB UR QL STRIP.AUTO: NEGATIVE
IMM GRANULOCYTES # BLD AUTO: 0.05 K/UL (ref 0–0.58)
IMM GRANULOCYTES NFR BLD: 1 % (ref 0–5)
KETONES UR STRIP-MCNC: 40 MG/DL
LEUKOCYTE ESTERASE UR QL STRIP: ABNORMAL
LYMPHOCYTES NFR BLD: 1.5 K/UL (ref 1.5–4)
LYMPHOCYTES RELATIVE PERCENT: 14 % (ref 20–42)
MAGNESIUM SERPL-MCNC: 2.1 MG/DL (ref 1.6–2.6)
MCH RBC QN AUTO: 29.9 PG (ref 26–35)
MCHC RBC AUTO-ENTMCNC: 32.9 G/DL (ref 32–34.5)
MCV RBC AUTO: 90.8 FL (ref 80–99.9)
MONOCYTES NFR BLD: 1.25 K/UL (ref 0.1–0.95)
MONOCYTES NFR BLD: 12 % (ref 2–12)
NEUTROPHILS NFR BLD: 72 % (ref 43–80)
NEUTS SEG NFR BLD: 7.54 K/UL (ref 1.8–7.3)
NITRITE UR QL STRIP: NEGATIVE
PH UR STRIP: 5.5 [PH] (ref 5–8)
PLATELET # BLD AUTO: 271 K/UL (ref 130–450)
PMV BLD AUTO: 12.1 FL (ref 7–12)
POTASSIUM SERPL-SCNC: 3.7 MMOL/L (ref 3.5–5.1)
PROT SERPL-MCNC: 6.6 G/DL (ref 6.4–8.3)
PROT UR STRIP-MCNC: ABNORMAL MG/DL
RBC # BLD AUTO: 4.99 M/UL (ref 3.5–5.5)
RBC #/AREA URNS HPF: ABNORMAL /HPF
SODIUM SERPL-SCNC: 139 MMOL/L (ref 136–145)
SP GR UR STRIP: >1.03 (ref 1–1.03)
UROBILINOGEN UR STRIP-ACNC: 1 EU/DL (ref 0–1)
WBC #/AREA URNS HPF: ABNORMAL /HPF
WBC OTHER # BLD: 10.5 K/UL (ref 4.5–11.5)

## 2025-05-08 PROCEDURE — 81001 URINALYSIS AUTO W/SCOPE: CPT

## 2025-05-08 PROCEDURE — 80053 COMPREHEN METABOLIC PANEL: CPT

## 2025-05-08 PROCEDURE — 85025 COMPLETE CBC W/AUTO DIFF WBC: CPT

## 2025-05-08 PROCEDURE — 96360 HYDRATION IV INFUSION INIT: CPT

## 2025-05-08 PROCEDURE — 96361 HYDRATE IV INFUSION ADD-ON: CPT

## 2025-05-08 PROCEDURE — 2580000003 HC RX 258

## 2025-05-08 PROCEDURE — 99284 EMERGENCY DEPT VISIT MOD MDM: CPT

## 2025-05-08 PROCEDURE — 83735 ASSAY OF MAGNESIUM: CPT

## 2025-05-08 PROCEDURE — 70450 CT HEAD/BRAIN W/O DYE: CPT

## 2025-05-08 PROCEDURE — 87086 URINE CULTURE/COLONY COUNT: CPT

## 2025-05-08 PROCEDURE — 93970 EXTREMITY STUDY: CPT

## 2025-05-08 RX ORDER — 0.9 % SODIUM CHLORIDE 0.9 %
1000 INTRAVENOUS SOLUTION INTRAVENOUS ONCE
Status: COMPLETED | OUTPATIENT
Start: 2025-05-08 | End: 2025-05-08

## 2025-05-08 RX ADMIN — SODIUM CHLORIDE 1000 ML: 0.9 INJECTION, SOLUTION INTRAVENOUS at 03:59

## 2025-05-08 ASSESSMENT — LIFESTYLE VARIABLES
HOW MANY STANDARD DRINKS CONTAINING ALCOHOL DO YOU HAVE ON A TYPICAL DAY: PATIENT DOES NOT DRINK
HOW OFTEN DO YOU HAVE A DRINK CONTAINING ALCOHOL: NEVER

## 2025-05-08 NOTE — ED NOTES
Nurse to nurse called to Mallory BRADFORD at [facility].    Eucrisa Pregnancy And Lactation Text: This medication has not been assigned a Pregnancy Risk Category but animal studies failed to show danger with the topical medication. It is unknown if the medication is excreted in breast milk.

## 2025-05-08 NOTE — CARE COORDINATION
Social Work/ Case Management Transition of Care Planning (Mirtha Smith, [phone]):     SW spoke with Pt's sister, Guadalupe, and let he know about the pending discharge.  Pt will return to long term facility and family is aware.   Mirtha Smith, DOLORES  5/8/2025

## 2025-05-08 NOTE — DISCHARGE INSTR - COC
Continuity of Care Form    Patient Name: Frances Zurita   :  1970  MRN:  [id number]    Admit date:  2025  Discharge date:  ***    Code Status Order: No Order   Advance Directives:     Admitting Physician:  No admitting provider for patient encounter.  PCP: Rg Fan DO    Discharging Nurse: ***  Discharging Hospital Unit/Room#: 18A/18A-18  Discharging Unit Phone Number: ***    Emergency Contact:   Extended Emergency Contact Information  Primary Emergency Contact: Ary Fabian  Home Phone: [phone]  Mobile Phone: [phone]  Relation: Brother/Sister   needed? No  Secondary Emergency Contact: Paulette Zurita   [facility]  Home Phone: [phone]  Relation: Parent    Past Surgical History:  Past Surgical History:   Procedure Laterality Date    BACLOFEN PUMP IMPLANTATION  2017       Immunization History:     There is no immunization history on file for this patient.    Active Problems:  Patient Active Problem List   Diagnosis Code    Multiple sclerosis (HCC) G35    Recurrent major depressive disorder, in remission F33.40    Vitamin D deficiency E55.9    Generalized anxiety disorder F41.1       Isolation/Infection:   Isolation            No Isolation          Patient Infection Status    None to display         Nurse Assessment:  Last Vital Signs: /78   Pulse (!) 106   Temp 98.1 °F (36.7 °C) (Oral)   Resp 18   LMP 2017   SpO2 94%     Last documented pain score (0-10 scale):    Last Weight:   Wt Readings from Last 1 Encounters:   22 72.6 kg (160 lb)     Mental Status:  {IP PT MENTAL STATUS:36083}    IV Access:  { JANE IV ACCESS:408026594}    Nursing Mobility/ADLs:  Walking   {CHP DME ADLs:968334987}  Transfer  {CHP DME ADLs:802291626}  Bathing  {CHP DME ADLs:725532933}  Dressing  {CHP DME ADLs:156819927}  Toileting  {CHP DME ADLs:729197436}  Feeding  {CHP DME ADLs:520009001}  Med Admin  {CHP DME ADLs:159568651}  Med Delivery   { JANE MED

## 2025-05-08 NOTE — ED PROVIDER NOTES
[facility] EMERGENCY DEPARTMENT  EMERGENCY DEPARTMENT ENCOUNTER        Pt Name: Frances Zurita  MRN: [id number]  Birthdate 1970  Date of evaluation: 5/8/2025  Provider: Senthil Severino MD  PCP: Rg Fan DO  Note Started: 6:41 PM EDT 5/8/25    CHIEF COMPLAINT       Chief Complaint   Patient presents with    Leg Pain     C/O bilateral leg pain x2 weeks. Patient states \"they feel weird like I have to move them sometimes and the nurse was challenging me\" HX MS       HISTORY OF PRESENT ILLNESS: 1 or more Elements   History From: Patient    Limitations to history : None  Social Determinants : None    Frances Zurita is a 55 y.o. female with past medical history of multiple sclerosis who presents to the emergency department with complaints of generalized weakness, and leg pains.  Patient is nonambulatory, and states that she has atrophy due to her inability to walk.  Patient called EMS because she stated that she was feeling\" weird and unable to move her legs like she is used to before.\"  Patient on occasion states that she has been feeling some vibrations, possible neuropathy.  Patient states that she is not using any medications for multiple sclerosis.  Patient is not currently on any steroids.  She denies any headaches, vision changes, fevers, chills, abdominal pain, dysuria, hematuria, diarrhea, constipations.    Nursing Notes were all reviewed and agreed with or any disagreements were addressed in the HPI.    ROS:   Pertinent positives and negatives are stated within HPI, all other systems reviewed and are negative.      --------------------------------------------- PAST HISTORY ---------------------------------------------  Past Medical History:  has a past medical history of Multiple sclerosis (HCC).    Past Surgical History:  has a past surgical history that includes baclofen pump implantation (01/2017).    Social History:  reports that she has never smoked.

## 2025-05-08 NOTE — DISCHARGE INSTRUCTIONS
Labs Reviewed   CBC WITH AUTO DIFFERENTIAL - Abnormal; Notable for the following components:       Result Value    MPV 12.1 (*)     Lymphocytes % 14 (*)     Neutrophils Absolute 7.54 (*)     Monocytes Absolute 1.25 (*)     All other components within normal limits   COMPREHENSIVE METABOLIC PANEL - Abnormal; Notable for the following components:    Chloride 97 (*)     Anion Gap 19 (*)     Glucose 104 (*)     Alkaline Phosphatase 117 (*)     All other components within normal limits   URINALYSIS WITH MICROSCOPIC - Abnormal; Notable for the following components:    Turbidity UA SLIGHTLY CLOUDY (*)     Bilirubin, Urine SMALL (*)     Ketones, Urine 40 (*)     Specific Gravity, UA >1.030 (*)     Protein, UA TRACE (*)     Leukocyte Esterase, Urine TRACE (*)     RBC, UA 3 to 5 (*)     Bacteria, UA 2+ (*)     All other components within normal limits   CULTURE, URINE   MAGNESIUM     Vascular duplex lower extremity venous bilateral   Final Result   No evidence of DVT in either lower extremity.         CT HEAD WO CONTRAST   Final Result   Motion artifact degrades image quality.  No gross acute intracranial   abnormality.

## 2025-05-09 LAB
MICROORGANISM SPEC CULT: ABNORMAL
MICROORGANISM SPEC CULT: ABNORMAL
SPECIMEN DESCRIPTION: ABNORMAL

## 2025-05-14 PROCEDURE — 99284 EMERGENCY DEPT VISIT MOD MDM: CPT

## 2025-05-15 ENCOUNTER — HOSPITAL ENCOUNTER (EMERGENCY)
Age: 55
Discharge: HOME | End: 2025-05-15
Attending: EMERGENCY MEDICINE
Payer: COMMERCIAL

## 2025-05-15 VITALS
BODY MASS INDEX: 31.41 KG/M2 | HEART RATE: 100 BPM | OXYGEN SATURATION: 96 % | WEIGHT: 160 LBS | TEMPERATURE: 97.8 F | HEIGHT: 60 IN | DIASTOLIC BLOOD PRESSURE: 96 MMHG | SYSTOLIC BLOOD PRESSURE: 147 MMHG | RESPIRATION RATE: 14 BRPM

## 2025-05-15 DIAGNOSIS — G35 MULTIPLE SCLEROSIS (HCC): ICD-10-CM

## 2025-05-15 DIAGNOSIS — F39 MOOD DISORDER: Primary | ICD-10-CM

## 2025-05-15 DIAGNOSIS — N39.0 UTI (URINARY TRACT INFECTION), UNCOMPLICATED: ICD-10-CM

## 2025-05-15 DIAGNOSIS — E87.6 HYPOKALEMIA: ICD-10-CM

## 2025-05-15 LAB
ALBUMIN SERPL-MCNC: 4.2 G/DL (ref 3.5–5.2)
ALP SERPL-CCNC: 109 U/L (ref 35–104)
ALT SERPL-CCNC: 22 U/L (ref 0–35)
AMPHET UR QL SCN: NEGATIVE
ANION GAP SERPL CALCULATED.3IONS-SCNC: 15 MMOL/L (ref 7–16)
APAP SERPL-MCNC: <5 UG/ML (ref 10–30)
AST SERPL-CCNC: 23 U/L (ref 0–35)
BACTERIA URNS QL MICRO: ABNORMAL
BARBITURATES UR QL SCN: NEGATIVE
BASOPHILS # BLD: 0.06 K/UL (ref 0–0.2)
BASOPHILS NFR BLD: 1 % (ref 0–2)
BENZODIAZ UR QL: NEGATIVE
BILIRUB SERPL-MCNC: 1.2 MG/DL (ref 0–1.2)
BILIRUB UR QL STRIP: ABNORMAL
BUN SERPL-MCNC: 7 MG/DL (ref 6–20)
BUPRENORPHINE UR QL: NEGATIVE
CALCIUM SERPL-MCNC: 9.6 MG/DL (ref 8.6–10)
CANNABINOIDS UR QL SCN: NEGATIVE
CHLORIDE SERPL-SCNC: 102 MMOL/L (ref 98–107)
CLARITY UR: CLEAR
CO2 SERPL-SCNC: 24 MMOL/L (ref 22–29)
COCAINE UR QL SCN: NEGATIVE
COLOR UR: YELLOW
CREAT SERPL-MCNC: 0.5 MG/DL (ref 0.5–1)
EKG ATRIAL RATE: 93 BPM
EKG P AXIS: 12 DEGREES
EKG P-R INTERVAL: 182 MS
EKG Q-T INTERVAL: 384 MS
EKG QRS DURATION: 80 MS
EKG QTC CALCULATION (BAZETT): 477 MS
EKG R AXIS: -26 DEGREES
EKG T AXIS: 6 DEGREES
EKG VENTRICULAR RATE: 93 BPM
EOSINOPHIL # BLD: 0.11 K/UL (ref 0.05–0.5)
EOSINOPHILS RELATIVE PERCENT: 1 % (ref 0–6)
EPI CELLS #/AREA URNS HPF: ABNORMAL /HPF
ERYTHROCYTE [DISTWIDTH] IN BLOOD BY AUTOMATED COUNT: 13.3 % (ref 11.5–15)
ETHANOLAMINE SERPL-MCNC: <10 MG/DL (ref 0–0.08)
FENTANYL UR QL: NEGATIVE
GFR, ESTIMATED: >90 ML/MIN/1.73M2
GLUCOSE SERPL-MCNC: 112 MG/DL (ref 74–99)
GLUCOSE UR STRIP-MCNC: NEGATIVE MG/DL
HCT VFR BLD AUTO: 42.2 % (ref 34–48)
HGB BLD-MCNC: 14.4 G/DL (ref 11.5–15.5)
HGB UR QL STRIP.AUTO: ABNORMAL
IMM GRANULOCYTES # BLD AUTO: 0.06 K/UL (ref 0–0.58)
IMM GRANULOCYTES NFR BLD: 1 % (ref 0–5)
KETONES UR STRIP-MCNC: >80 MG/DL
LEUKOCYTE ESTERASE UR QL STRIP: ABNORMAL
LYMPHOCYTES NFR BLD: 1.55 K/UL (ref 1.5–4)
LYMPHOCYTES RELATIVE PERCENT: 13 % (ref 20–42)
MCH RBC QN AUTO: 30.2 PG (ref 26–35)
MCHC RBC AUTO-ENTMCNC: 34.1 G/DL (ref 32–34.5)
MCV RBC AUTO: 88.5 FL (ref 80–99.9)
METHADONE UR QL: NEGATIVE
MONOCYTES NFR BLD: 1.4 K/UL (ref 0.1–0.95)
MONOCYTES NFR BLD: 12 % (ref 2–12)
NEUTROPHILS NFR BLD: 73 % (ref 43–80)
NEUTS SEG NFR BLD: 8.35 K/UL (ref 1.8–7.3)
NITRITE UR QL STRIP: NEGATIVE
OPIATES UR QL SCN: NEGATIVE
OXYCODONE UR QL SCN: NEGATIVE
PCP UR QL SCN: NEGATIVE
PH UR STRIP: 6 [PH] (ref 5–8)
PLATELET # BLD AUTO: 255 K/UL (ref 130–450)
PMV BLD AUTO: 11.9 FL (ref 7–12)
POTASSIUM SERPL-SCNC: 3.2 MMOL/L (ref 3.5–5.1)
PROT SERPL-MCNC: 6.5 G/DL (ref 6.4–8.3)
PROT UR STRIP-MCNC: NEGATIVE MG/DL
RBC # BLD AUTO: 4.77 M/UL (ref 3.5–5.5)
RBC #/AREA URNS HPF: ABNORMAL /HPF
SALICYLATES SERPL-MCNC: <0.5 MG/DL (ref 0–30)
SODIUM SERPL-SCNC: 142 MMOL/L (ref 136–145)
SP GR UR STRIP: 1.02 (ref 1–1.03)
TEST INFORMATION: NORMAL
TOXIC TRICYCLIC SC,BLOOD: NEGATIVE
UROBILINOGEN UR STRIP-ACNC: 1 EU/DL (ref 0–1)
WBC #/AREA URNS HPF: ABNORMAL /HPF
WBC OTHER # BLD: 11.5 K/UL (ref 4.5–11.5)

## 2025-05-15 PROCEDURE — 93010 ELECTROCARDIOGRAM REPORT: CPT | Performed by: INTERNAL MEDICINE

## 2025-05-15 PROCEDURE — 80179 DRUG ASSAY SALICYLATE: CPT

## 2025-05-15 PROCEDURE — 90791 PSYCH DIAGNOSTIC EVALUATION: CPT

## 2025-05-15 PROCEDURE — 85025 COMPLETE CBC W/AUTO DIFF WBC: CPT

## 2025-05-15 PROCEDURE — G0480 DRUG TEST DEF 1-7 CLASSES: HCPCS

## 2025-05-15 PROCEDURE — 93005 ELECTROCARDIOGRAM TRACING: CPT | Performed by: EMERGENCY MEDICINE

## 2025-05-15 PROCEDURE — 80307 DRUG TEST PRSMV CHEM ANLYZR: CPT

## 2025-05-15 PROCEDURE — 80143 DRUG ASSAY ACETAMINOPHEN: CPT

## 2025-05-15 PROCEDURE — 81001 URINALYSIS AUTO W/SCOPE: CPT

## 2025-05-15 PROCEDURE — 87086 URINE CULTURE/COLONY COUNT: CPT

## 2025-05-15 PROCEDURE — 80053 COMPREHEN METABOLIC PANEL: CPT

## 2025-05-15 PROCEDURE — 6370000000 HC RX 637 (ALT 250 FOR IP): Performed by: EMERGENCY MEDICINE

## 2025-05-15 RX ORDER — CEFDINIR 300 MG/1
300 CAPSULE ORAL 2 TIMES DAILY
Qty: 14 CAPSULE | Refills: 0 | Status: SHIPPED | OUTPATIENT
Start: 2025-05-15 | End: 2025-05-22

## 2025-05-15 RX ORDER — CEFDINIR 300 MG/1
300 CAPSULE ORAL ONCE
Status: COMPLETED | OUTPATIENT
Start: 2025-05-15 | End: 2025-05-15

## 2025-05-15 RX ADMIN — POTASSIUM BICARBONATE 40 MEQ: 782 TABLET, EFFERVESCENT ORAL at 07:36

## 2025-05-15 RX ADMIN — CEFDINIR 300 MG: 300 CAPSULE ORAL at 07:36

## 2025-05-15 ASSESSMENT — PAIN - FUNCTIONAL ASSESSMENT: PAIN_FUNCTIONAL_ASSESSMENT: NONE - DENIES PAIN
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
## 2025-05-15 NOTE — ED NOTES
Attempted to call report to [facility]-  attempted to connect to nurse - no answer. Left this rn name and number for the nurse to call back the ed to up date the nurse if needed.

## 2025-05-15 NOTE — ED PROVIDER NOTES
HPI:  5/15/25, Time: 12:04 AM EDT         Frances Zurita is a 55 y.o. female history of  MS presenting to the ED for psychiatric evaluation, beginning 1 day ago.  The complaint has been persistent, moderate in severity, and worsened by nothing.  Patient was brought here by EMS reportedly at facility patient was threatening to harm her self she reportedly had razor blades in her bed.  Patient reportedly told staff that she was going to place a pillow over her head to.  Patient denying that she wants to hurt herself or others she reports no homicidal thoughts she reports no hallucinations she does report history of MS.  She reports no new weakness.  Patient reporting no chest pain no difficulty breathing she reports no abdominal pain or vomiting there is no fever chills or cough.    ROS:   Pertinent positives and negatives are stated within HPI, all other systems reviewed and are negative.  --------------------------------------------- PAST HISTORY ---------------------------------------------  Past Medical History:  has a past medical history of Multiple sclerosis (HCC).    Past Surgical History:  has a past surgical history that includes baclofen pump implantation (01/2017).    Social History:  reports that she has never smoked. She has never used smokeless tobacco. She reports that she does not drink alcohol and does not use drugs.    Family History: family history is not on file.     The patient’s home medications have been reviewed.    Allergies: Patient has no known allergies.    ---------------------------------------------------PHYSICAL EXAM--------------------------------------    Constitutional/General: Alert and oriented x3, well appearing, non toxic in NAD  Head: Normocephalic and atraumatic  Eyes: PERRL, EOMI  Mouth: Oropharynx clear, handling secretions, no trismus  Neck: Supple, full ROM, non tender to palpation in the midline, no stridor, no crepitus, no meningeal signs  Pulmonary: Lungs clear to  breathing she reports no abdominal pain or vomiting there is no fever chills or cough.  CC/HPI Summary, DDx, ED Course, Reassessment, Tests Considered, Patient expectation:            Frances Zurita is a 55 y.o. female history of  MS presenting to the ED for psychiatric evaluation, beginning 1 day ago.  The complaint has been persistent, moderate in severity, and worsened by nothing.  Patient was brought here by EMS reportedly at facility patient was threatening to harm her self she reportedly had razor blades in her bed.  Patient reportedly told staff that she was going to place a pillow over her head to.  Patient denying that she wants to hurt herself or others she reports no homicidal thoughts she reports no hallucinations she does report history of MS.  She reports no new weakness.  Patient reporting no chest pain no difficulty breathing she reports no abdominal pain or vomiting there is no fever chills or cough.  Patient awake alert oriented x 3 heart exam normal lungs are clear abdomen soft nontender.  Patient able to move upper extremities limb range of motion of lower extremities.  Patient affect denies homicidal suicidal thoughts reports no hallucinations.  Patient differential includes mood disorder as well as electrolyte disturbance.  Patient lab work white count is 11.5 and was 14 patient's platelet count was 255 sodium is 142 potassium 3.2 BUN 7 creatinine 0.5 alcohol level less than 10 alk phos is 109 urine drug screen negative urine nitrate negative leukocyte Estrace +69 WBCs trace bacteria.  Patient EKG sinus nonspecific.  Patient observed here in emergency room patient was eval by telepsych.  Patient did not meet criteria for admission patient having no homicidal suicidal thoughts she does have the razor blades in her bed but reports she is using to shave legs.  Patient denies wanting to hurt herself and telepsych did speak to facility as well.  Patient will be discharged back to facility.  Patient

## 2025-05-16 LAB
MICROORGANISM SPEC CULT: ABNORMAL
MICROORGANISM SPEC CULT: ABNORMAL
SERVICE CMNT-IMP: ABNORMAL
SPECIMEN DESCRIPTION: ABNORMAL

## 2025-06-01 ENCOUNTER — HOSPITAL ENCOUNTER (INPATIENT)
Age: 55
LOS: 4 days | Discharge: NURSING, REHAB OR LONG-TERM CARE | DRG: 690 | End: 2025-06-05
Attending: EMERGENCY MEDICINE | Admitting: INTERNAL MEDICINE
Payer: COMMERCIAL

## 2025-06-01 ENCOUNTER — APPOINTMENT (OUTPATIENT)
Dept: CT IMAGING | Age: 55
DRG: 690 | End: 2025-06-01
Payer: COMMERCIAL

## 2025-06-01 DIAGNOSIS — R29.90 STROKE-LIKE SYMPTOMS: Primary | ICD-10-CM

## 2025-06-01 DIAGNOSIS — G35 MULTIPLE SCLEROSIS (HCC): ICD-10-CM

## 2025-06-01 LAB
ALBUMIN SERPL-MCNC: 4.1 G/DL (ref 3.5–5.2)
ALP SERPL-CCNC: 108 U/L (ref 35–104)
ALT SERPL-CCNC: 25 U/L (ref 0–35)
ANION GAP SERPL CALCULATED.3IONS-SCNC: 12 MMOL/L (ref 7–16)
AST SERPL-CCNC: 22 U/L (ref 0–35)
BASOPHILS # BLD: 0.06 K/UL (ref 0–0.2)
BASOPHILS NFR BLD: 0 % (ref 0–2)
BILIRUB SERPL-MCNC: 0.7 MG/DL (ref 0–1.2)
BUN SERPL-MCNC: 10 MG/DL (ref 6–20)
CALCIUM SERPL-MCNC: 9.5 MG/DL (ref 8.6–10)
CHLORIDE SERPL-SCNC: 107 MMOL/L (ref 98–107)
CHP ED QC CHECK: YES
CO2 SERPL-SCNC: 26 MMOL/L (ref 22–29)
CREAT SERPL-MCNC: 0.8 MG/DL (ref 0.5–1)
EOSINOPHIL # BLD: 0.21 K/UL (ref 0.05–0.5)
EOSINOPHILS RELATIVE PERCENT: 2 % (ref 0–6)
ERYTHROCYTE [DISTWIDTH] IN BLOOD BY AUTOMATED COUNT: 13.4 % (ref 11.5–15)
GFR, ESTIMATED: 86 ML/MIN/1.73M2
GLUCOSE BLD-MCNC: 114 MG/DL
GLUCOSE BLD-MCNC: 114 MG/DL (ref 74–99)
GLUCOSE SERPL-MCNC: 121 MG/DL (ref 74–99)
HCT VFR BLD AUTO: 43.5 % (ref 34–48)
HGB BLD-MCNC: 15 G/DL (ref 11.5–15.5)
IMM GRANULOCYTES # BLD AUTO: 0.06 K/UL (ref 0–0.58)
IMM GRANULOCYTES NFR BLD: 0 % (ref 0–5)
INR PPP: 1.2
LYMPHOCYTES NFR BLD: 1.14 K/UL (ref 1.5–4)
LYMPHOCYTES RELATIVE PERCENT: 8 % (ref 20–42)
MAGNESIUM SERPL-MCNC: 2.1 MG/DL (ref 1.6–2.6)
MCH RBC QN AUTO: 30.6 PG (ref 26–35)
MCHC RBC AUTO-ENTMCNC: 34.5 G/DL (ref 32–34.5)
MCV RBC AUTO: 88.8 FL (ref 80–99.9)
MONOCYTES NFR BLD: 1.42 K/UL (ref 0.1–0.95)
MONOCYTES NFR BLD: 10 % (ref 2–12)
NEUTROPHILS NFR BLD: 79 % (ref 43–80)
NEUTS SEG NFR BLD: 11.07 K/UL (ref 1.8–7.3)
PLATELET # BLD AUTO: 257 K/UL (ref 130–450)
PMV BLD AUTO: 11.7 FL (ref 7–12)
POTASSIUM SERPL-SCNC: 3.3 MMOL/L (ref 3.5–5.1)
PROT SERPL-MCNC: 6.3 G/DL (ref 6.4–8.3)
PROTHROMBIN TIME: 12.9 SEC (ref 9.3–12.4)
RBC # BLD AUTO: 4.9 M/UL (ref 3.5–5.5)
SODIUM SERPL-SCNC: 145 MMOL/L (ref 136–145)
TROPONIN I SERPL HS-MCNC: 51 NG/L (ref 0–14)
WBC OTHER # BLD: 14 K/UL (ref 4.5–11.5)

## 2025-06-01 PROCEDURE — 70496 CT ANGIOGRAPHY HEAD: CPT

## 2025-06-01 PROCEDURE — 84484 ASSAY OF TROPONIN QUANT: CPT

## 2025-06-01 PROCEDURE — 83735 ASSAY OF MAGNESIUM: CPT

## 2025-06-01 PROCEDURE — 99285 EMERGENCY DEPT VISIT HI MDM: CPT

## 2025-06-01 PROCEDURE — 2060000000 HC ICU INTERMEDIATE R&B

## 2025-06-01 PROCEDURE — 6360000002 HC RX W HCPCS: Performed by: EMERGENCY MEDICINE

## 2025-06-01 PROCEDURE — 80053 COMPREHEN METABOLIC PANEL: CPT

## 2025-06-01 PROCEDURE — 93005 ELECTROCARDIOGRAM TRACING: CPT | Performed by: EMERGENCY MEDICINE

## 2025-06-01 PROCEDURE — 6360000004 HC RX CONTRAST MEDICATION: Performed by: RADIOLOGY

## 2025-06-01 PROCEDURE — 70450 CT HEAD/BRAIN W/O DYE: CPT

## 2025-06-01 PROCEDURE — 70498 CT ANGIOGRAPHY NECK: CPT

## 2025-06-01 PROCEDURE — 85610 PROTHROMBIN TIME: CPT

## 2025-06-01 PROCEDURE — 82962 GLUCOSE BLOOD TEST: CPT

## 2025-06-01 PROCEDURE — 0042T CT BRAIN PERFUSION: CPT

## 2025-06-01 PROCEDURE — 96374 THER/PROPH/DIAG INJ IV PUSH: CPT

## 2025-06-01 PROCEDURE — 85025 COMPLETE CBC W/AUTO DIFF WBC: CPT

## 2025-06-01 RX ORDER — LORAZEPAM 2 MG/ML
1 INJECTION INTRAMUSCULAR ONCE
Status: COMPLETED | OUTPATIENT
Start: 2025-06-01 | End: 2025-06-01

## 2025-06-01 RX ORDER — IOPAMIDOL 755 MG/ML
110 INJECTION, SOLUTION INTRAVASCULAR
Status: COMPLETED | OUTPATIENT
Start: 2025-06-01 | End: 2025-06-01

## 2025-06-01 RX ADMIN — LORAZEPAM 1 MG: 2 INJECTION INTRAMUSCULAR; INTRAVENOUS at 20:39

## 2025-06-01 RX ADMIN — IOPAMIDOL 110 ML: 755 INJECTION, SOLUTION INTRAVENOUS at 20:50

## 2025-06-01 ASSESSMENT — PAIN - FUNCTIONAL ASSESSMENT: PAIN_FUNCTIONAL_ASSESSMENT: NONE - DENIES PAIN

## 2025-06-02 ENCOUNTER — APPOINTMENT (OUTPATIENT)
Dept: MRI IMAGING | Age: 55
DRG: 690 | End: 2025-06-02
Payer: COMMERCIAL

## 2025-06-02 ENCOUNTER — APPOINTMENT (OUTPATIENT)
Dept: GENERAL RADIOLOGY | Age: 55
DRG: 690 | End: 2025-06-02
Payer: COMMERCIAL

## 2025-06-02 LAB
AMMONIA PLAS-SCNC: 16 UMOL/L (ref 11–51)
BACTERIA URNS QL MICRO: ABNORMAL
BILIRUB UR QL STRIP: NEGATIVE
CHOLEST SERPL-MCNC: 139 MG/DL
CK SERPL-CCNC: 198 U/L (ref 0–170)
CLARITY UR: ABNORMAL
COLOR UR: YELLOW
EKG ATRIAL RATE: 103 BPM
EKG P AXIS: 30 DEGREES
EKG P-R INTERVAL: 194 MS
EKG Q-T INTERVAL: 360 MS
EKG QRS DURATION: 88 MS
EKG QTC CALCULATION (BAZETT): 471 MS
EKG R AXIS: 0 DEGREES
EKG T AXIS: 28 DEGREES
EKG VENTRICULAR RATE: 103 BPM
GLUCOSE UR STRIP-MCNC: NEGATIVE MG/DL
HBA1C MFR BLD: 5.3 % (ref 4–5.6)
HDLC SERPL-MCNC: 48 MG/DL
HGB UR QL STRIP.AUTO: ABNORMAL
KETONES UR STRIP-MCNC: >80 MG/DL
LDLC SERPL CALC-MCNC: 67 MG/DL
LEUKOCYTE ESTERASE UR QL STRIP: ABNORMAL
MAGNESIUM SERPL-MCNC: 2.2 MG/DL (ref 1.6–2.6)
NITRITE UR QL STRIP: POSITIVE
PH UR STRIP: 5.5 [PH] (ref 5–8)
PROT UR STRIP-MCNC: 30 MG/DL
RBC #/AREA URNS HPF: ABNORMAL /HPF
SP GR UR STRIP: >1.03 (ref 1–1.03)
TRIGL SERPL-MCNC: 120 MG/DL
TROPONIN I SERPL HS-MCNC: 49 NG/L (ref 0–14)
TROPONIN I SERPL HS-MCNC: 54 NG/L (ref 0–14)
TSH SERPL DL<=0.05 MIU/L-ACNC: 1.22 UIU/ML (ref 0.27–4.2)
UROBILINOGEN UR STRIP-ACNC: 1 EU/DL (ref 0–1)
VLDLC SERPL CALC-MCNC: 24 MG/DL
WBC #/AREA URNS HPF: ABNORMAL /HPF

## 2025-06-02 PROCEDURE — 2500000003 HC RX 250 WO HCPCS: Performed by: INTERNAL MEDICINE

## 2025-06-02 PROCEDURE — 82550 ASSAY OF CK (CPK): CPT

## 2025-06-02 PROCEDURE — 87077 CULTURE AEROBIC IDENTIFY: CPT

## 2025-06-02 PROCEDURE — 87086 URINE CULTURE/COLONY COUNT: CPT

## 2025-06-02 PROCEDURE — 2500000003 HC RX 250 WO HCPCS: Performed by: NURSE PRACTITIONER

## 2025-06-02 PROCEDURE — 83735 ASSAY OF MAGNESIUM: CPT

## 2025-06-02 PROCEDURE — 93010 ELECTROCARDIOGRAM REPORT: CPT | Performed by: INTERNAL MEDICINE

## 2025-06-02 PROCEDURE — 81001 URINALYSIS AUTO W/SCOPE: CPT

## 2025-06-02 PROCEDURE — 71045 X-RAY EXAM CHEST 1 VIEW: CPT

## 2025-06-02 PROCEDURE — 6360000002 HC RX W HCPCS: Performed by: INTERNAL MEDICINE

## 2025-06-02 PROCEDURE — 80061 LIPID PANEL: CPT

## 2025-06-02 PROCEDURE — 84484 ASSAY OF TROPONIN QUANT: CPT

## 2025-06-02 PROCEDURE — 2060000000 HC ICU INTERMEDIATE R&B

## 2025-06-02 PROCEDURE — 6370000000 HC RX 637 (ALT 250 FOR IP): Performed by: NURSE PRACTITIONER

## 2025-06-02 PROCEDURE — 82140 ASSAY OF AMMONIA: CPT

## 2025-06-02 PROCEDURE — 2580000003 HC RX 258: Performed by: NURSE PRACTITIONER

## 2025-06-02 PROCEDURE — 87040 BLOOD CULTURE FOR BACTERIA: CPT

## 2025-06-02 PROCEDURE — 83036 HEMOGLOBIN GLYCOSYLATED A1C: CPT

## 2025-06-02 PROCEDURE — 84443 ASSAY THYROID STIM HORMONE: CPT

## 2025-06-02 RX ORDER — BACLOFEN 5 MG/1
5 TABLET ORAL 2 TIMES DAILY
COMMUNITY

## 2025-06-02 RX ORDER — GUAIFENESIN AND DEXTROMETHORPHAN HYDROBROMIDE 10; 100 MG/5ML; MG/5ML
5 SYRUP ORAL EVERY 4 HOURS PRN
COMMUNITY

## 2025-06-02 RX ORDER — BISACODYL 10 MG
10 SUPPOSITORY, RECTAL RECTAL DAILY PRN
Status: DISCONTINUED | OUTPATIENT
Start: 2025-06-02 | End: 2025-06-06 | Stop reason: HOSPADM

## 2025-06-02 RX ORDER — ACETAMINOPHEN 325 MG/1
650 TABLET ORAL EVERY 6 HOURS PRN
Status: DISCONTINUED | OUTPATIENT
Start: 2025-06-02 | End: 2025-06-06 | Stop reason: HOSPADM

## 2025-06-02 RX ORDER — SODIUM CHLORIDE 0.9 % (FLUSH) 0.9 %
5-40 SYRINGE (ML) INJECTION PRN
Status: DISCONTINUED | OUTPATIENT
Start: 2025-06-02 | End: 2025-06-06 | Stop reason: HOSPADM

## 2025-06-02 RX ORDER — ACETAMINOPHEN 650 MG/1
650 SUPPOSITORY RECTAL EVERY 6 HOURS PRN
Status: DISCONTINUED | OUTPATIENT
Start: 2025-06-02 | End: 2025-06-06 | Stop reason: HOSPADM

## 2025-06-02 RX ORDER — OFATUMUMAB 20 MG/.4ML
20 INJECTION, SOLUTION SUBCUTANEOUS
COMMUNITY

## 2025-06-02 RX ORDER — HYDROXYZINE PAMOATE 25 MG/1
25 CAPSULE ORAL 4 TIMES DAILY PRN
COMMUNITY
Start: 2025-05-30 | End: 2025-06-13

## 2025-06-02 RX ORDER — SODIUM CHLORIDE 9 MG/ML
INJECTION, SOLUTION INTRAVENOUS PRN
Status: DISCONTINUED | OUTPATIENT
Start: 2025-06-02 | End: 2025-06-06 | Stop reason: HOSPADM

## 2025-06-02 RX ORDER — POTASSIUM CHLORIDE 1500 MG/1
40 TABLET, EXTENDED RELEASE ORAL PRN
Status: DISPENSED | OUTPATIENT
Start: 2025-06-02 | End: 2025-06-05

## 2025-06-02 RX ORDER — MORPHINE SULFATE 2 MG/ML
2 INJECTION, SOLUTION INTRAMUSCULAR; INTRAVENOUS ONCE
Status: DISCONTINUED | OUTPATIENT
Start: 2025-06-02 | End: 2025-06-06 | Stop reason: HOSPADM

## 2025-06-02 RX ORDER — MAGNESIUM SULFATE IN WATER 40 MG/ML
2000 INJECTION, SOLUTION INTRAVENOUS PRN
Status: DISCONTINUED | OUTPATIENT
Start: 2025-06-02 | End: 2025-06-06 | Stop reason: HOSPADM

## 2025-06-02 RX ORDER — POTASSIUM CHLORIDE 7.45 MG/ML
10 INJECTION INTRAVENOUS PRN
Status: ACTIVE | OUTPATIENT
Start: 2025-06-02 | End: 2025-06-05

## 2025-06-02 RX ORDER — ENOXAPARIN SODIUM 100 MG/ML
40 INJECTION SUBCUTANEOUS DAILY
Status: CANCELLED | OUTPATIENT
Start: 2025-06-02

## 2025-06-02 RX ORDER — SODIUM CHLORIDE 0.9 % (FLUSH) 0.9 %
5-40 SYRINGE (ML) INJECTION EVERY 12 HOURS SCHEDULED
Status: DISCONTINUED | OUTPATIENT
Start: 2025-06-02 | End: 2025-06-06 | Stop reason: HOSPADM

## 2025-06-02 RX ORDER — BACLOFEN 10 MG/1
5 TABLET ORAL 2 TIMES DAILY
Status: DISCONTINUED | OUTPATIENT
Start: 2025-06-02 | End: 2025-06-06 | Stop reason: HOSPADM

## 2025-06-02 RX ORDER — KETOROLAC TROMETHAMINE 30 MG/ML
15 INJECTION, SOLUTION INTRAMUSCULAR; INTRAVENOUS EVERY 6 HOURS PRN
Status: DISCONTINUED | OUTPATIENT
Start: 2025-06-02 | End: 2025-06-06 | Stop reason: HOSPADM

## 2025-06-02 RX ORDER — POTASSIUM CHLORIDE 7.45 MG/ML
10 INJECTION INTRAVENOUS
Status: ACTIVE | OUTPATIENT
Start: 2025-06-02 | End: 2025-06-02

## 2025-06-02 RX ORDER — LORATADINE 10 MG/1
10 TABLET ORAL DAILY PRN
COMMUNITY

## 2025-06-02 RX ORDER — SODIUM CHLORIDE 9 MG/ML
INJECTION, SOLUTION INTRAVENOUS CONTINUOUS
Status: DISCONTINUED | OUTPATIENT
Start: 2025-06-02 | End: 2025-06-03

## 2025-06-02 RX ADMIN — POTASSIUM CHLORIDE 40 MEQ: 1500 TABLET, EXTENDED RELEASE ORAL at 19:05

## 2025-06-02 RX ADMIN — SODIUM CHLORIDE, PRESERVATIVE FREE 10 ML: 5 INJECTION INTRAVENOUS at 21:28

## 2025-06-02 RX ADMIN — KETOROLAC TROMETHAMINE 15 MG: 30 INJECTION, SOLUTION INTRAMUSCULAR at 12:38

## 2025-06-02 RX ADMIN — SODIUM CHLORIDE: 9 INJECTION, SOLUTION INTRAVENOUS at 09:47

## 2025-06-02 RX ADMIN — WATER 1000 MG: 1 INJECTION INTRAMUSCULAR; INTRAVENOUS; SUBCUTANEOUS at 17:30

## 2025-06-02 ASSESSMENT — PAIN DESCRIPTION - LOCATION: LOCATION: LEG

## 2025-06-02 ASSESSMENT — PAIN DESCRIPTION - DESCRIPTORS: DESCRIPTORS: ACHING

## 2025-06-02 ASSESSMENT — PAIN DESCRIPTION - ORIENTATION: ORIENTATION: LEFT;RIGHT

## 2025-06-02 ASSESSMENT — PAIN SCALES - GENERAL: PAINLEVEL_OUTOF10: 7

## 2025-06-02 NOTE — ED NOTES
MRI called to see if patient can come back after medicated, left voicemail. Please call ED RN [phone] if patient able to come back tonight.

## 2025-06-02 NOTE — ED NOTES
Encounter Details  Date Type Department Care Team (Latest Contact Info) Description   01/11/2024 1:30 PM EST - 01/11/2024 4:20 PM EST Surgery [facility] OR   [address] [place] [address]  Monique Marcus MD PhD   [address] [place] [address] [phone] (Work)   [phone] (Fax)  Intrathecal Pump Insertion Permanent [43140 (CPT®)]         MRI asking who placed baclofen pump. Pump believed to be turned off recently.

## 2025-06-02 NOTE — ED NOTES
IV found pulled out by patient and pt found chewing on catheter. Removed from pt's possession and sitter ordered.

## 2025-06-02 NOTE — ED NOTES
Attempted to call Dr. De Los Santos with no response. Message left. Unable to reach [facility] at this time as well for request for pain medication.

## 2025-06-02 NOTE — ED PROVIDER NOTES
Department of Emergency Medicine   ED  Provider Note  Admit Date/RoomTime: 2025  8:57 PM  ED Room: [facility]        Written by: Mel Montes De Oca DO  Patient Name: Frances Zurita  Attending Provider: Claudette Montes De Oca*  Admit Date: 2025  8:57 PM  MRN: [id number]    : 1970      History of Present Illness:  25, Time: 8:27 PM EDT  Chief Complaint   Patient presents with    Cerebrovascular Accident     LKW \"sometime last night\" Pt fell out of bed last night, denies hitting head. Hx MS. Tonight pt began having slurred speech. No thinners           HPI   Patient is a 55 y.o. female with past medical history of MS presenting for concern for slurred speech and possible stroke.  History obtained by EMS initially.  They state that patient fell sometime yesterday.  They unsure when.  They note patient's been slurring her speech and thinks it has been since yesterday so they brought her in for evaluation.  They were unsure of last known well.  I did speak with facility and discussed case.  They states she has had intermittent garbled and slurred speech for many weeks and months.  Her states her symptoms wax and wane.  They do note she had a fall around 8:00 they think yesterday.  They state maybe her speech was a little bit more slurred earlier today but that tends to happen usually in the evenings.  They state that the slurred speech again that she has today seems to be what she gets daily.  They note that she has been having worsening symptoms for weeks to months.  They are trying to get her seen by psychiatry.  They state slurring of her speech has been ongoing and is not new as of yesterday.  Spoke with patient's sister.  She is concerned and feels like patient needs a full psychiatric evaluation as well as she notes that her symptoms seem to have been worsening over the last few weeks to months.  She is the POA.  She does note patient also had a baclofen pump which she weaned off of.  They  NOTE.      NOTE: This report was transcribed using voice recognition software. Every effort was made to ensure accuracy; however, inadvertent computerized transcription errors may be present       Mel Montes De Oca DO  06/01/25 [id number]

## 2025-06-02 NOTE — PROGRESS NOTES
SPEECH LANGUAGE PATHOLOGY  DAILY PROGRESS NOTE        PATIENT NAME:  Frances Zurita      ROOM:  ALCANTARA KISHA :  1970         TODAY'S DATE:  2025     PT with history of MS.  A MBS is recommended to evaluate swallowing function due to higher incidence of silent aspiration.   A physician or is needed and can be completed tomorrow 6-3-25

## 2025-06-02 NOTE — PROGRESS NOTES
Patient was in to much pain to have MRI scans done, can attempt again once patients pain is managed.

## 2025-06-02 NOTE — ED NOTES
Morphine order held until MRI can be completed d/t one time dose. Patient resting with eyes closed at this time, sitter at bedside.

## 2025-06-02 NOTE — CARE COORDINATION
Social Work /Transition of Care:    Pt presented to the ED on 6/1 secondary to a fall out of bed and slurred speech.  Pt sent to the ED from [facility].  Pt has hx of MS and per paperwork from the facility, pt is DNRCC.   Yennifer dias was called upon pt arrival to the ED.     Pt has a bedside .  Pt initially confused pulled out her IV and it was also noted that she was chewing on her catheter.  Pt is admitted inpatient with stroke like symptoms.  Pt has a neurology consult and an order for MRI of her brain and cervical spine.    CORY spoke to pt's sister/POA, Ary, who lives out of town.  Pt's sister has concerns about pt's recent ED visits and several recent events and behaviors including pt not taking her antidepressant and \"cheeking\" her medication and/or putting pills in a tissue that were later found in pt's bedside drawer.  Pt's sister reports pt was weaned off of baclofen in the past year and had her pump replaced.  Pt is currently on oral baclofen.  Pt's sister reports pt has recently verbalized wanting to die and pt's sister is concerned pt may have fallen out of bed in attempt to harm herself as she was told by facility staff pt raised her bed up and then had an unwitnessed fall.  CORY notified admitting provider of above information.    CORY/CM will follow.

## 2025-06-02 NOTE — H&P
[facility]  History and Physical      CHIEF COMPLAINT:    Chief Complaint   Patient presents with    Cerebrovascular Accident     LKW \"sometime last night\" Pt fell out of bed last night, denies hitting head. Hx MS. Tonight pt began having slurred speech. No thinners        Patient of Rg Fan DO presents with:  Stroke-like symptoms    History of Present Illness:   Ms. Zurita is a 55 year old  female with a past medical history of Multiple Sclerosis, s/p baclofen pump implantation 2017 (weaned off).  Ms. Zurita presented to [facility] ED on 06/01/2025 with complaints of falling out of bed without hitting her head and notable slurred speech at the facility where she currently resides.  Per ED report upon discussion with the facility, she is noted to have slurred speech predominantly in the evenings however her speech was worse than at baseline.  Her speech has been worse over the past several weeks to months.  She was more recently placed on PO Baclofen.  Upon arrival to the ED her VS were oral temperature 97.6-106-18-98% RA-138/92.  WBC 14.  H&H 15/43.5.  .  K3.3.  BUN/SCR 10/0.8.  Troponin 51.  Blood glucose 114.  CT of the head, brain, CTA head and neck with recent stroke cannot be entirely excluded.  Limited exam due to motion artifact.  EKG ST with .  Her case was discussed with neurology during her ED course she was deemed not to be a TNK candidate with no evidence needed for IR and no LVO.  She received Ativan 1 mg.  On evaluation resting comfortably in no acute distress-she is markedly confused with poor hygiene on evaluation.  Apparently she comes from a nursing facility.  On my evaluation there is a sitter at bedside and indicates she has been very confused and agitated since arrival.    REVIEW OF SYSTEMS:  Pertinent negatives are above in HPI.  10 point ROS otherwise negative.      Past Medical History:   Diagnosis Date    Multiple sclerosis (HCC) 09/1996  2025 08:28 PM    LYMPHSABS 1.14 2025 08:28 PM    EOSABS 0.21 2025 08:28 PM    BASOSABS 0.06 2025 08:28 PM     CMP:    Lab Results   Component Value Date/Time     2025 08:28 PM    K 3.3 2025 08:28 PM    K 4.4 2019 10:27 PM     2025 08:28 PM    CO2 26 2025 08:28 PM    BUN 10 2025 08:28 PM    CREATININE 0.8 2025 08:28 PM    GFRAA >60 2019 10:27 PM    LABGLOM 86 2025 08:28 PM    LABGLOM >60 2022 08:43 AM    GLUCOSE 114 2025 08:42 PM    CALCIUM 9.5 2025 08:28 PM    BILITOT 0.7 2025 08:28 PM    ALKPHOS 108 2025 08:28 PM    AST 22 2025 08:28 PM    ALT 25 2025 08:28 PM       Imagin2025 CTA Head:  1. Limited exam due to motion.   2. Abnormal perfusion in left frontal and left parietal lobes which may be   related to artifactual in etiology given extensive motion.  Recent stroke   cannot be entirely excluded.  MRI recommended for further evaluation.   3. Subtle areas of decreased attenuation are present in the left frontal and   parietal lobes.   4. No acute intracranial hemorrhage.   5. Significantly limited CTA head due to motion.  No obvious arterial   occlusion.   6. No stenosis involving the carotid arteries or vertebral arteries     2025 CT Head WO contrast:  1. Limited exam due to motion.   2. Abnormal perfusion in left frontal and left parietal lobes which may be   related to artifactual in etiology given extensive motion.  Recent stroke   cannot be entirely excluded.  MRI recommended for further evaluation.   3. Subtle areas of decreased attenuation are present in the left frontal and   parietal lobes.   4. No acute intracranial hemorrhage.   5. Significantly limited CTA head due to motion.  No obvious arterial   occlusion.   6. No stenosis involving the carotid arteries or vertebral arteries     2025 CT Brain Perfusion:  1. Limited exam due to motion.   2. Abnormal

## 2025-06-03 ENCOUNTER — APPOINTMENT (OUTPATIENT)
Dept: GENERAL RADIOLOGY | Age: 55
DRG: 690 | End: 2025-06-03
Payer: COMMERCIAL

## 2025-06-03 ENCOUNTER — APPOINTMENT (OUTPATIENT)
Dept: MRI IMAGING | Age: 55
DRG: 690 | End: 2025-06-03
Payer: COMMERCIAL

## 2025-06-03 LAB
ANION GAP SERPL CALCULATED.3IONS-SCNC: 15 MMOL/L (ref 7–16)
BASOPHILS # BLD: 0.07 K/UL (ref 0–0.2)
BASOPHILS NFR BLD: 1 % (ref 0–2)
BUN SERPL-MCNC: 7 MG/DL (ref 6–20)
CALCIUM SERPL-MCNC: 9.3 MG/DL (ref 8.6–10)
CHLORIDE SERPL-SCNC: 106 MMOL/L (ref 98–107)
CO2 SERPL-SCNC: 22 MMOL/L (ref 22–29)
CREAT SERPL-MCNC: 0.5 MG/DL (ref 0.5–1)
EOSINOPHIL # BLD: 0.25 K/UL (ref 0.05–0.5)
EOSINOPHILS RELATIVE PERCENT: 3 % (ref 0–6)
ERYTHROCYTE [DISTWIDTH] IN BLOOD BY AUTOMATED COUNT: 13.8 % (ref 11.5–15)
GFR, ESTIMATED: >90 ML/MIN/1.73M2
GLUCOSE SERPL-MCNC: 76 MG/DL (ref 74–99)
HCT VFR BLD AUTO: 42.3 % (ref 34–48)
HGB BLD-MCNC: 14 G/DL (ref 11.5–15.5)
IMM GRANULOCYTES # BLD AUTO: 0.04 K/UL (ref 0–0.58)
IMM GRANULOCYTES NFR BLD: 0 % (ref 0–5)
LYMPHOCYTES NFR BLD: 1.25 K/UL (ref 1.5–4)
LYMPHOCYTES RELATIVE PERCENT: 14 % (ref 20–42)
MCH RBC QN AUTO: 30.3 PG (ref 26–35)
MCHC RBC AUTO-ENTMCNC: 33.1 G/DL (ref 32–34.5)
MCV RBC AUTO: 91.6 FL (ref 80–99.9)
MONOCYTES NFR BLD: 1.09 K/UL (ref 0.1–0.95)
MONOCYTES NFR BLD: 12 % (ref 2–12)
NEUTROPHILS NFR BLD: 71 % (ref 43–80)
NEUTS SEG NFR BLD: 6.48 K/UL (ref 1.8–7.3)
PLATELET # BLD AUTO: 213 K/UL (ref 130–450)
PMV BLD AUTO: 12.1 FL (ref 7–12)
POTASSIUM SERPL-SCNC: 3.9 MMOL/L (ref 3.5–5.1)
RBC # BLD AUTO: 4.62 M/UL (ref 3.5–5.5)
SODIUM SERPL-SCNC: 143 MMOL/L (ref 136–145)
WBC OTHER # BLD: 9.2 K/UL (ref 4.5–11.5)

## 2025-06-03 PROCEDURE — 36415 COLL VENOUS BLD VENIPUNCTURE: CPT

## 2025-06-03 PROCEDURE — 92523 SPEECH SOUND LANG COMPREHEN: CPT

## 2025-06-03 PROCEDURE — 92526 ORAL FUNCTION THERAPY: CPT

## 2025-06-03 PROCEDURE — 92507 TX SP LANG VOICE COMM INDIV: CPT

## 2025-06-03 PROCEDURE — 2500000003 HC RX 250 WO HCPCS: Performed by: PHYSICIAN ASSISTANT

## 2025-06-03 PROCEDURE — 2500000003 HC RX 250 WO HCPCS: Performed by: INTERNAL MEDICINE

## 2025-06-03 PROCEDURE — 85025 COMPLETE CBC W/AUTO DIFF WBC: CPT

## 2025-06-03 PROCEDURE — 2500000003 HC RX 250 WO HCPCS: Performed by: NURSE PRACTITIONER

## 2025-06-03 PROCEDURE — 6360000002 HC RX W HCPCS: Performed by: NURSE PRACTITIONER

## 2025-06-03 PROCEDURE — 6360000002 HC RX W HCPCS: Performed by: INTERNAL MEDICINE

## 2025-06-03 PROCEDURE — 99221 1ST HOSP IP/OBS SF/LOW 40: CPT | Performed by: NURSE PRACTITIONER

## 2025-06-03 PROCEDURE — 74230 X-RAY XM SWLNG FUNCJ C+: CPT

## 2025-06-03 PROCEDURE — 6370000000 HC RX 637 (ALT 250 FOR IP)

## 2025-06-03 PROCEDURE — 92611 MOTION FLUOROSCOPY/SWALLOW: CPT

## 2025-06-03 PROCEDURE — 2580000003 HC RX 258: Performed by: NURSE PRACTITIONER

## 2025-06-03 PROCEDURE — 2060000000 HC ICU INTERMEDIATE R&B

## 2025-06-03 PROCEDURE — 80048 BASIC METABOLIC PNL TOTAL CA: CPT

## 2025-06-03 RX ORDER — LORAZEPAM 2 MG/ML
1 INJECTION INTRAMUSCULAR ONCE
Status: COMPLETED | OUTPATIENT
Start: 2025-06-03 | End: 2025-06-03

## 2025-06-03 RX ADMIN — BARIUM SULFATE 15 ML: 400 PASTE ORAL at 13:59

## 2025-06-03 RX ADMIN — SODIUM CHLORIDE, PRESERVATIVE FREE 10 ML: 5 INJECTION INTRAVENOUS at 09:06

## 2025-06-03 RX ADMIN — BACLOFEN 5 MG: 10 TABLET ORAL at 00:03

## 2025-06-03 RX ADMIN — BARIUM SULFATE 15 ML: 0.81 POWDER, FOR SUSPENSION ORAL at 13:59

## 2025-06-03 RX ADMIN — SODIUM CHLORIDE, PRESERVATIVE FREE 10 ML: 5 INJECTION INTRAVENOUS at 19:24

## 2025-06-03 RX ADMIN — BARIUM SULFATE 15 ML: 400 SUSPENSION ORAL at 13:59

## 2025-06-03 RX ADMIN — BACLOFEN 5 MG: 10 TABLET ORAL at 19:21

## 2025-06-03 RX ADMIN — BACLOFEN 5 MG: 10 TABLET ORAL at 09:06

## 2025-06-03 RX ADMIN — SODIUM CHLORIDE: 9 INJECTION, SOLUTION INTRAVENOUS at 10:12

## 2025-06-03 RX ADMIN — SODIUM CHLORIDE: 9 INJECTION, SOLUTION INTRAVENOUS at 00:05

## 2025-06-03 RX ADMIN — WATER 1000 MG: 1 INJECTION INTRAMUSCULAR; INTRAVENOUS; SUBCUTANEOUS at 17:15

## 2025-06-03 RX ADMIN — LORAZEPAM 1 MG: 2 INJECTION INTRAMUSCULAR; INTRAVENOUS at 14:40

## 2025-06-03 ASSESSMENT — PAIN SCALES - GENERAL
PAINLEVEL_OUTOF10: 0

## 2025-06-03 NOTE — PROGRESS NOTES
Patient brought down for a second attempt in MRI. Patient in a lot of pain, unable to lay flat on the table without bending knees up and lifting her head from the pain. Patient medicated and still unable to hold still. RN aware and will update doctor.

## 2025-06-03 NOTE — PLAN OF CARE
Problem: Discharge Planning  Goal: Discharge to home or other facility with appropriate resources  Outcome: Progressing     Problem: ABCDS Injury Assessment  Goal: Absence of physical injury  Outcome: Progressing     Problem: Confusion  Goal: Confusion, delirium, dementia, or psychosis is improved or at baseline  Description: INTERVENTIONS:1. Assess for possible contributors to thought disturbance, including medications, impaired vision or hearing, underlying metabolic abnormalities, dehydration, psychiatric diagnoses, and notify attending LIP2. [place] high risk fall precautions, as indicated3. Provide frequent short contacts to provide reality reorientation, refocusing and direction4. Decrease environmental stimuli, including noise as appropriate5. Monitor and intervene to maintain adequate nutrition, hydration, elimination, sleep and activity6. If unable to ensure safety without constant attention obtain sitter and review sitter guidelines with assigned personnel7. Initiate Psychosocial CNS and Spiritual Care consult, as indicated  INTERVENTIONS:1. Assess for possible contributors to thought disturbance, including medications, impaired vision or hearing, underlying metabolic abnormalities, dehydration, psychiatric diagnoses, and notify attending LIP2. [place] high risk fall precautions, as indicated3. Provide frequent short contacts to provide reality reorientation, refocusing and direction4. Decrease environmental stimuli, including noise as appropriate5. Monitor and intervene to maintain adequate nutrition, hydration, elimination, sleep and activity6. If unable to ensure safety without constant attention obtain sitter and review sitter guidelines with assigned personnel7. Initiate Psychosocial CNS and Spiritual Care consult, as indicated  INTERVENTIONS:1. Assess for possible contributors to thought disturbance, including medications, impaired vision or hearing, underlying metabolic abnormalities,  Initiate Psychosocial CNS and Spiritual Care consult, as indicated  Outcome: Progressing     Problem: Safety - Adult  Goal: Free from fall injury  Outcome: Progressing     Problem: Skin/Tissue Integrity  Goal: Skin integrity remains intact  Description: 1.  Monitor for areas of redness and/or skin breakdown2.  Assess vascular access sites hourly3.  Every 4-6 hours minimum:  Change oxygen saturation probe site4.  Every 4-6 hours:  If on nasal continuous positive airway pressure, respiratory therapy assess nares and determine need for appliance change or resting period  Outcome: Progressing

## 2025-06-03 NOTE — CONSULTS
Reported on 6/2/2025    Gagan Herbert MD   aluminum & magnesium hydroxide-simethicone (MYLANTA) 400-400-40 MG/5ML SUSP Take 30 mLs by mouth every 4 hours as needed (epigastric discomfort)    Gagan Herbert MD   dextromethorphan (DELSYM) 30 MG/5ML extended release liquid Take 30 mg by mouth 2 times daily as needed for Cough  Patient not taking: Reported on 6/2/2025    Gagan Herbert MD   loperamide (IMODIUM) 2 MG capsule Take 1 capsule by mouth every 3 hours as needed for Diarrhea    Gagan Herbert MD   bisacodyl (DULCOLAX) 10 MG suppository Place 10 mg rectally daily as needed for Constipation  Patient not taking: Reported on 6/2/2025    Gagan Herbert MD   acetaminophen (TYLENOL) 325 MG tablet Take 2 tablets by mouth every 4 hours as needed for Pain    Gagan Herbert MD   Cholecalciferol (VITAMIN D3) 55160 units CAPS Take 1 capsule by mouth once a week Indications: every Sunday   Patient not taking: Reported on 6/2/2025    Gagan Herbert MD   baclofen (LIORESAL) 0.05 MG/ML injection 50 mcg by Intrathecal route once  Patient not taking: Reported on 6/2/2025    Gagan Herbert MD       Social History:     Denies ETOH, tobacco, or illicit drugs    Family History:     History reviewed. No pertinent family history.     History of Present Illness:     Patient presented to ED on June 1, 2025 when she fell out of bed and began having slurred speech.  PMH of MS. MCARTHUR unknown.  ED did speak with family and they had noted that patient has intermittent garbled/slurred speech for many weeks and months now.  They note that her symptoms wax and wane.  They were aware of her fall.  Her slurred speech usually worsens in the evening.  They noted that her symptoms have been worsening in the last few weeks to months.  Patient does have a baclofen pump which is been slowly being weaned off.  She was started on oral baclofen earlier this month.  NIH 8.  Recent ED visits on May 8 and  sternocleidomastoid strength 5/5   XI: neck extension strength  5/5   XII: tongue strength  Normal     Motor:  5/5 throughout except LLE 3/5 strength  Normal bulk and tone except LLE appears more atrophied with mild contracture  No drift to BUE  No abnormal movements    Sensory:  LT and PP normal  Vibration normal     Coordination:   FN, FFM and MARIA ESTHER normal    Gait:  Deferred    DTR:   2+ throughout    No Babinskis    Laboratory/Radiology:     CBC with Differential:    Lab Results   Component Value Date/Time    WBC 9.2 06/03/2025 05:46 AM    RBC 4.62 06/03/2025 05:46 AM    HGB 14.0 06/03/2025 05:46 AM    HCT 42.3 06/03/2025 05:46 AM     06/03/2025 05:46 AM    MCV 91.6 06/03/2025 05:46 AM    MCH 30.3 06/03/2025 05:46 AM    MCHC 33.1 06/03/2025 05:46 AM    RDW 13.8 06/03/2025 05:46 AM    METASPCT 1.0 09/21/2018 01:06 PM    LYMPHOPCT 14 06/03/2025 05:46 AM    MONOPCT 12 06/03/2025 05:46 AM    MYELOPCT 1.0 09/21/2018 01:06 PM    EOSPCT 3 06/03/2025 05:46 AM    BASOPCT 1 06/03/2025 05:46 AM    MONOSABS 1.09 06/03/2025 05:46 AM    LYMPHSABS 1.25 06/03/2025 05:46 AM    EOSABS 0.25 06/03/2025 05:46 AM    BASOSABS 0.07 06/03/2025 05:46 AM     CMP:    Lab Results   Component Value Date/Time     06/03/2025 05:46 AM    K 3.9 06/03/2025 05:46 AM    K 4.4 03/30/2019 10:27 PM     06/03/2025 05:46 AM    CO2 22 06/03/2025 05:46 AM    BUN 7 06/03/2025 05:46 AM    CREATININE 0.5 06/03/2025 05:46 AM    GFRAA >60 03/30/2019 10:27 PM    LABGLOM >90 06/03/2025 05:46 AM    LABGLOM >60 11/26/2022 08:43 AM    GLUCOSE 76 06/03/2025 05:46 AM    CALCIUM 9.3 06/03/2025 05:46 AM    BILITOT 0.7 06/01/2025 08:28 PM    ALKPHOS 108 06/01/2025 08:28 PM    AST 22 06/01/2025 08:28 PM    ALT 25 06/01/2025 08:28 PM     Hepatic Function Panel:    Lab Results   Component Value Date/Time    ALKPHOS 108 06/01/2025 08:28 PM    ALT 25 06/01/2025 08:28 PM    AST 22 06/01/2025 08:28 PM    BILITOT 0.7 06/01/2025 08:28 PM     HgBA1c:    Lab

## 2025-06-03 NOTE — PLAN OF CARE
Problem: Discharge Planning  Goal: Discharge to home or other facility with appropriate resources  Outcome: Progressing     Problem: ABCDS Injury Assessment  Goal: Absence of physical injury  Outcome: Progressing     Problem: Confusion  Goal: Confusion, delirium, dementia, or psychosis is improved or at baseline  Description: INTERVENTIONS:1. Assess for possible contributors to thought disturbance, including medications, impaired vision or hearing, underlying metabolic abnormalities, dehydration, psychiatric diagnoses, and notify attending LIP2. [place] high risk fall precautions, as indicated3. Provide frequent short contacts to provide reality reorientation, refocusing and direction4. Decrease environmental stimuli, including noise as appropriate5. Monitor and intervene to maintain adequate nutrition, hydration, elimination, sleep and activity6. If unable to ensure safety without constant attention obtain sitter and review sitter guidelines with assigned personnel7. Initiate Psychosocial CNS and Spiritual Care consult, as indicated  INTERVENTIONS:1. Assess for possible contributors to thought disturbance, including medications, impaired vision or hearing, underlying metabolic abnormalities, dehydration, psychiatric diagnoses, and notify attending LIP2. [place] high risk fall precautions, as indicated3. Provide frequent short contacts to provide reality reorientation, refocusing and direction4. Decrease environmental stimuli, including noise as appropriate5. Monitor and intervene to maintain adequate nutrition, hydration, elimination, sleep and activity6. If unable to ensure safety without constant attention obtain sitter and review sitter guidelines with assigned personnel7. Initiate Psychosocial CNS and Spiritual Care consult, as indicated  INTERVENTIONS:1. Assess for possible contributors to thought disturbance, including medications, impaired vision or hearing, underlying metabolic abnormalities,

## 2025-06-03 NOTE — PLAN OF CARE
Problem: Discharge Planning  Goal: Discharge to home or other facility with appropriate resources  6/2/2025 2311 by Javier Goodwin RN  Outcome: Progressing  6/2/2025 2242 by Javier Goodwin RN  Outcome: Progressing     Problem: ABCDS Injury Assessment  Goal: Absence of physical injury  6/2/2025 2311 by Javier Goodwin RN  Outcome: Progressing  6/2/2025 2242 by Javier Goodwin RN  Outcome: Progressing     Problem: Confusion  Goal: Confusion, delirium, dementia, or psychosis is improved or at baseline  Description: INTERVENTIONS:1. Assess for possible contributors to thought disturbance, including medications, impaired vision or hearing, underlying metabolic abnormalities, dehydration, psychiatric diagnoses, and notify attending LIP2. [place] high risk fall precautions, as indicated3. Provide frequent short contacts to provide reality reorientation, refocusing and direction4. Decrease environmental stimuli, including noise as appropriate5. Monitor and intervene to maintain adequate nutrition, hydration, elimination, sleep and activity6. If unable to ensure safety without constant attention obtain sitter and review sitter guidelines with assigned personnel7. Initiate Psychosocial CNS and Spiritual Care consult, as indicated  INTERVENTIONS:1. Assess for possible contributors to thought disturbance, including medications, impaired vision or hearing, underlying metabolic abnormalities, dehydration, psychiatric diagnoses, and notify attending LIP2. [place] high risk fall precautions, as indicated3. Provide frequent short contacts to provide reality reorientation, refocusing and direction4. Decrease environmental stimuli, including noise as appropriate5. Monitor and intervene to maintain adequate nutrition, hydration, elimination, sleep and activity6. If unable to ensure safety without constant attention obtain sitter and review sitter guidelines with assigned personnel7. Initiate Psychosocial CNS and Spiritual Care consult,

## 2025-06-03 NOTE — PROGRESS NOTES
SPEECH/LANGUAGE PATHOLOGY  VIDEOFLUOROSCOPIC STUDY OF SWALLOWING (MBS)   and PLAN OF CARE    PATIENT NAME:  Frances Zurita  (female)     MRN:  [id number]    :  1970  (55 y.o.)  STATUS:  Inpatient: Room 8509/8509-B    TODAY'S DATE:  6/3/2025  ORDER DATE, DESCRIPTION AND REFERRING PROVIDER:  Rakel Howard APRN - CNP  : FL MODIFIED BARIUM SWALLOW W VIDEO :  6/3/25  REASON FOR REFERRAL: dysphagia   EVALUATING THERAPIST: Debbie Low      RESULTS:      DYSPHAGIA DIAGNOSIS:  Clinical indicators of mild pharyngeal dysphagia    DIET RECOMMENDATIONS:  Regular consistency solids (IDDSI level 7) with  thin liquids (IDDSI level 0)-- SMALL BITES/SIPS    FEEDING RECOMMENDATIONS:    Assistance level:  No assistance needed     Compensatory strategies recommended: Fully alert for all PO, Thorough oral care to prevent colonization of oral bacteria, Upright in bed/ chair as tolerated  Double swallow to clear pharyngeal residue   Slow rate of intake   SINGLE cup sips  SINGLE straw sips   SMALL bites       Discussed recommendations with:  Patient  and patient nurse via phone    Laryngeal Penetration and Aspiration:  Penetration WITHOUT aspiration was observed in today's study with  thin liquid    SPEECH THERAPY  PLAN OF CARE   The dysphagia POC is established based on physician order and dysphagia diagnosis    Meal time assessment for 1-2 sessions to provide diet modification and compensatory strategy implementation due to need to ensure proper implementation of compensatory strategies during PO intake       Conditions Requiring Skilled Therapeutic Intervention for dysphagia:    Patient is performing below functional baseline d/t  current acute condition, Multiple diagnoses, multiple medications, and increased dependency upon caregivers.    SPECIFIC DYSPHAGIA INTERVENTIONS TO INCLUDE:     compensatory swallowing strategies to improve airway protection and swallow function.  ongoing mealtime assessment to provide diet  colonization of oral bacteria, Upright in bed/ chair as tolerated  Double swallow to clear pharyngeal residue   Slow rate of intake   SINGLE cup sips  SINGLE straw sips   SMALL bites      Images from MBSS reviewed with patient/ family and education provided. Reviewed aspiration precautions. Encouraged patient and/or family to engage SLP in unstructured Q&A session relative to identified deficit areas; indicated understanding of all information provided via satisfactory verbal response.      Rebecca Soto M.S., CCC-SLP  Speech-Language Pathologist  SP. [phone]

## 2025-06-03 NOTE — PROGRESS NOTES
Notified  Rakel Howard NP, pt /100 manually. Orders given to stop continuous fluids of sodium chloride and monitor BP. Orders given to get MBSS per Rakel Howard NP.

## 2025-06-03 NOTE — CARE COORDINATION
Transition of care, per chart review- presents secondary to a fall out of bed and slurred speech. Pt sent to the ED from [facility]. Pt has hx of MS  Neurology consult and an order for MRI of her brain and cervical spine.IV Rocephin  PT has had baclofen pump  but pt is currently on oral baclofen. Pt's sister reports pt has recently verbalized wanting to die and pt's sister is concerned pt may have fallen out of bed in attempt to harm herself as she was told by facility staff pt raised her bed up and then had an unwitnessed fall. She is asking for a psych consult. Met with patient who is A&O x3. She lives @ [facility]  per her sister ( has been there 10 years) await pt/ot eval - probably needs snf.  Referral sent thru [facility]port for LEON[facility]- can return await PT OT  to determine al or snf  will need to be sitter free x 24 hrs to go to snf Electronically signed by Marina Carrillo RN on 6/3/2025 at 1:41 PM

## 2025-06-03 NOTE — PROGRESS NOTES
[facility]                                Progress note    Subjective:    The patient is awake and alert.    Resting in bed  Up and alert  Sitter at bedside     Objective:    BP (!) 160/100 Comment: manual  Pulse (!) 106   Temp 98.4 °F (36.9 °C)   Resp 18   Ht 1.524 m (5')   Wt 69 kg (152 lb 1.9 oz)   LMP 07/19/2017   SpO2 97%   BMI 29.71 kg/m²     In: -   Out: 300   In: -   Out: 300 [Urine:300]    General appearance: NAD, conversant  HEENT: AT/NC, MMM  Neck: FROM, supple  Lungs: Clear to auscultation  CV: RRR, no MRGs  Vasc: Radial pulses 2+  Abdomen: Soft, non-tender; no masses or HSM  Extremities: No peripheral edema or digital cyanosis  Skin: no rash, lesions or ulcers  Psych: Alert and oriented to person, place and time  Neuro: Alert and interactive     Recent Labs     06/01/25 2028 06/03/25  0546   WBC 14.0* 9.2   HGB 15.0 14.0   HCT 43.5 42.3    213       Recent Labs     06/01/25 2028 06/03/25  0546    143   K 3.3* 3.9    106   CO2 26 22   BUN 10 7   CREATININE 0.8 0.5   CALCIUM 9.5 9.3       ASSESSMENT:  Strokelike symptoms vs MS flare with CT of the head with recent stroke unable to be entirely excluded however limited due to motion artifact  Known Hx Multiple Sclerosis  S/p Baclofen pump (recently turned off)  Troponin elevation without chest pain or acute ischemic EKG changes  Hypokalemia, supplemented  Hyperglycemia with no known Hx DM  Leukocytosis           PLAN:  Await MRI results to assess for acute stroke  Significant UA-will initiate empiric IV antibiotic therapy as her confusion and symptoms may be from a urinary tract infection, await final cultures  Dual antiplatelet therapy with aspirin and Plavix, blood pressure control, high intensity statin   PT OT evaluation for rehab-though I am told she does not move around much at the nursing facility at baseline  Neurology consulted  MRI of the brain and cervical spine pending  Check HA1C, FLP  Check

## 2025-06-03 NOTE — PROGRESS NOTES
Orders given to consult psych per Rakel Howard NP.  Psych consult sent to Ani Baker NP and added to Dr. Noriega treatment team.

## 2025-06-03 NOTE — PROGRESS NOTES
SPEECH/LANGUAGE PATHOLOGY  SPEECH/LANGUAGE/COGNITIVE EVALUATION   and PLAN OF CARE      PATIENT NAME:  Frances Zurita  (female)     MRN:  [id number]    :  1970  (55 y.o.)  STATUS:  Inpatient: Room 8509/8509-B    TODAY'S DATE:  6/3/2025  ORDER DATE, DESCRIPTION AND REFERRING PROVIDER :   25 0730  SLP eval and treat  Start:  25,   End:  25,   ONE TIME,   Standing Count:  1 Occurrences,   R         Meron, April, APRN - CNP     REASON FOR REFERRAL:  speech and swallow function  EVALUATING THERAPIST: Debbie Low    ADMITTING DIAGNOSIS: Multiple sclerosis (HCC) [G35]  Stroke-like symptoms [R29.90]    VISIT DIAGNOSIS:        SPEECH THERAPY  PLAN OF CARE   The speech therapy  POC is established based on physician order, speech pathology diagnosis and results of clinical assessment     SPEECH PATHOLOGY DIAGNOSIS:    Moderate cognitive-linguistic deficits; Mild dysarthria     Speech Pathology intervention is recommended up to 5 times per week for LOS or when goals are met with emphasis on the following:      Conditions Requiring Skilled Therapeutic Intervention for speech, language and/or cognition    Cognitive linguistic impairment  Decreased problem solving skills   Dysarthria    Specific Speech Therapy Interventions to Include:   Receptive language training   Expressive language training   Therapeutic tasks for Cognition  Therapeutic tasks for dysarthria    Specific instructions for next treatment:     To initiate POC    SHORT/LONG TERM GOALS  Pt will improve problem solving/thought organization during structured and unstructured tasks with 70% accuracy   Pt will improve receptive and expressive language skills with adequate thought content, organization, and processing time to facilitate improved communication with minimal.  Pt will Improve receptive language skills for comprehension of simple level yes/no questions, basic commands (auditory/written format), and for  observation of tasks, assessment of data and education on plan of care and goals.      CPT code:    95912  eval speech sound lang comprehension      The admitting diagnosis and active problem list, as listed below have been reviewed prior to initiation of this evaluation.        ACTIVE PROBLEM LIST:   Patient Active Problem List   Diagnosis    Multiple sclerosis (HCC)    Recurrent major depressive disorder, in remission    Vitamin D deficiency    Generalized anxiety disorder    Stroke-like symptoms       Debbie Low   Speech Pathology  Student Clinician    Rebecca Soto M.S., CCC-SLP  Speech-Language Pathologist  SP. [phone]          Intervention:     18540  speech/language tx: Patient seen for f/u for cognitive-linguistic tx. Patient educated on SLP goals of care targeting problem solving, reasoning, organization, expressive/receptive language tasks, and improving speech intelligibility with use of compensatory strategies. Patient verbalized understanding and agreement. Patient able to answer open-ended questions w/ fair accuracy noted. Patient demonstrating poor-fair recall of recent events. Will cont w/ POC.     Rebecca Soto M.S., CCC-SLP  Speech-Language Pathologist  SP. [phone]

## 2025-06-03 NOTE — PROGRESS NOTES
1930 hrs-  pt admitted to [facility].  Vitals recorded.  Applied cardiac monitor, pt reading nsr.  Sitter with pt at time of admission.  Bed alarm activated.  Pt alert to self.  Attempted to reorient pt to time and day and pt unable to repeat back.  Called sister Ary to ask appropriate questions to admit pt.  Sister voiced concerns and they were reported to provider Radha Cleary.  Pt code status changed to dnrcc.

## 2025-06-03 NOTE — PROGRESS NOTES
Notified Rakel Howard NP, pt unable to get MRI. Pt was unable to lay flat. Ativan was given but still unable to get MRI.    Also, notified Rakel Howard NP, pt became confused and pulling grown off and refusing medication at this time    Notified Dr. Campos, pt unable to get MRI done

## 2025-06-04 DIAGNOSIS — G35 MULTIPLE SCLEROSIS (HCC): ICD-10-CM

## 2025-06-04 DIAGNOSIS — R29.90 STROKE-LIKE SYMPTOMS: Primary | ICD-10-CM

## 2025-06-04 LAB
ANION GAP SERPL CALCULATED.3IONS-SCNC: 8 MMOL/L (ref 7–16)
BASOPHILS # BLD: 0.08 K/UL (ref 0–0.2)
BASOPHILS NFR BLD: 1 % (ref 0–2)
BUN SERPL-MCNC: 6 MG/DL (ref 6–20)
CALCIUM SERPL-MCNC: 9.2 MG/DL (ref 8.6–10)
CHLORIDE SERPL-SCNC: 109 MMOL/L (ref 98–107)
CO2 SERPL-SCNC: 26 MMOL/L (ref 22–29)
CREAT SERPL-MCNC: 0.5 MG/DL (ref 0.5–1)
EOSINOPHIL # BLD: 0.34 K/UL (ref 0.05–0.5)
EOSINOPHILS RELATIVE PERCENT: 4 % (ref 0–6)
ERYTHROCYTE [DISTWIDTH] IN BLOOD BY AUTOMATED COUNT: 14 % (ref 11.5–15)
GFR, ESTIMATED: >90 ML/MIN/1.73M2
GLUCOSE SERPL-MCNC: 109 MG/DL (ref 74–99)
HCT VFR BLD AUTO: 41.4 % (ref 34–48)
HGB BLD-MCNC: 13.7 G/DL (ref 11.5–15.5)
IMM GRANULOCYTES # BLD AUTO: 0.07 K/UL (ref 0–0.58)
IMM GRANULOCYTES NFR BLD: 1 % (ref 0–5)
LYMPHOCYTES NFR BLD: 1.15 K/UL (ref 1.5–4)
LYMPHOCYTES RELATIVE PERCENT: 14 % (ref 20–42)
MCH RBC QN AUTO: 30.3 PG (ref 26–35)
MCHC RBC AUTO-ENTMCNC: 33.1 G/DL (ref 32–34.5)
MCV RBC AUTO: 91.6 FL (ref 80–99.9)
MICROORGANISM SPEC CULT: ABNORMAL
MONOCYTES NFR BLD: 0.97 K/UL (ref 0.1–0.95)
MONOCYTES NFR BLD: 12 % (ref 2–12)
NEUTROPHILS NFR BLD: 69 % (ref 43–80)
NEUTS SEG NFR BLD: 5.68 K/UL (ref 1.8–7.3)
PLATELET # BLD AUTO: 205 K/UL (ref 130–450)
PMV BLD AUTO: 12.3 FL (ref 7–12)
POTASSIUM SERPL-SCNC: 4.2 MMOL/L (ref 3.5–5.1)
RBC # BLD AUTO: 4.52 M/UL (ref 3.5–5.5)
SERVICE CMNT-IMP: ABNORMAL
SODIUM SERPL-SCNC: 144 MMOL/L (ref 136–145)
SPECIMEN DESCRIPTION: ABNORMAL
WBC OTHER # BLD: 8.3 K/UL (ref 4.5–11.5)

## 2025-06-04 PROCEDURE — 6370000000 HC RX 637 (ALT 250 FOR IP)

## 2025-06-04 PROCEDURE — 80048 BASIC METABOLIC PNL TOTAL CA: CPT

## 2025-06-04 PROCEDURE — 2500000003 HC RX 250 WO HCPCS: Performed by: NURSE PRACTITIONER

## 2025-06-04 PROCEDURE — 99221 1ST HOSP IP/OBS SF/LOW 40: CPT | Performed by: PSYCHIATRY & NEUROLOGY

## 2025-06-04 PROCEDURE — 2500000003 HC RX 250 WO HCPCS: Performed by: INTERNAL MEDICINE

## 2025-06-04 PROCEDURE — 6360000002 HC RX W HCPCS: Performed by: INTERNAL MEDICINE

## 2025-06-04 PROCEDURE — 97166 OT EVAL MOD COMPLEX 45 MIN: CPT

## 2025-06-04 PROCEDURE — 97530 THERAPEUTIC ACTIVITIES: CPT

## 2025-06-04 PROCEDURE — 2060000000 HC ICU INTERMEDIATE R&B

## 2025-06-04 PROCEDURE — 97535 SELF CARE MNGMENT TRAINING: CPT

## 2025-06-04 PROCEDURE — 85025 COMPLETE CBC W/AUTO DIFF WBC: CPT

## 2025-06-04 PROCEDURE — 36415 COLL VENOUS BLD VENIPUNCTURE: CPT

## 2025-06-04 PROCEDURE — 97161 PT EVAL LOW COMPLEX 20 MIN: CPT

## 2025-06-04 RX ORDER — FLUOXETINE 10 MG/1
10 TABLET, FILM COATED ORAL DAILY
Status: DISCONTINUED | OUTPATIENT
Start: 2025-06-04 | End: 2025-06-06 | Stop reason: HOSPADM

## 2025-06-04 RX ADMIN — KETOROLAC TROMETHAMINE 15 MG: 30 INJECTION, SOLUTION INTRAMUSCULAR at 21:24

## 2025-06-04 RX ADMIN — KETOROLAC TROMETHAMINE 15 MG: 30 INJECTION, SOLUTION INTRAMUSCULAR at 15:53

## 2025-06-04 RX ADMIN — SODIUM CHLORIDE, PRESERVATIVE FREE 10 ML: 5 INJECTION INTRAVENOUS at 21:26

## 2025-06-04 RX ADMIN — SODIUM CHLORIDE, PRESERVATIVE FREE 10 ML: 5 INJECTION INTRAVENOUS at 09:15

## 2025-06-04 RX ADMIN — BACLOFEN 5 MG: 10 TABLET ORAL at 09:13

## 2025-06-04 RX ADMIN — WATER 1000 MG: 1 INJECTION INTRAMUSCULAR; INTRAVENOUS; SUBCUTANEOUS at 15:53

## 2025-06-04 RX ADMIN — BACLOFEN 5 MG: 10 TABLET ORAL at 21:23

## 2025-06-04 RX ADMIN — KETOROLAC TROMETHAMINE 15 MG: 30 INJECTION, SOLUTION INTRAMUSCULAR at 00:57

## 2025-06-04 ASSESSMENT — PAIN SCALES - GENERAL
PAINLEVEL_OUTOF10: 10
PAINLEVEL_OUTOF10: 6
PAINLEVEL_OUTOF10: 5
PAINLEVEL_OUTOF10: 8
PAINLEVEL_OUTOF10: 7

## 2025-06-04 ASSESSMENT — PAIN DESCRIPTION - DESCRIPTORS: DESCRIPTORS: ACHING;CRAMPING;DISCOMFORT

## 2025-06-04 ASSESSMENT — PAIN DESCRIPTION - LOCATION
LOCATION: BACK

## 2025-06-04 ASSESSMENT — PAIN - FUNCTIONAL ASSESSMENT: PAIN_FUNCTIONAL_ASSESSMENT: ACTIVITIES ARE NOT PREVENTED

## 2025-06-04 NOTE — CONSULTS
[facility]  [facility]    Consultation Note     Admit Date: 6/1/2025  8:57 PM    Reason for Consult:   ESBL culture from urinary    Attending Physician:  Kami De Los Santos MD     Chief Complaint: Fell out of bed    HISTORY OF PRESENT ILLNESS:   The patient is a 55 y.o. woman with MS and not known to the Infectious Diseases service. The patient is is admitted after having a fall and apparently did not hit her head after extended-care facility.  She apparently had some slurred speech and was seen in the ED and it was felt that since her baseline speech was not slurred she was admitted for further evaluation and observation.  Her MS has been progressive.  She is on baclofen.  She has been afebrile since admission and apparently is incontinent of urine wearing diapers.  In the emergency room she did have a UA which showed 21 white cells per high-power field and some blood cultures were positive for E. coli.  E. coli was interpreted as being ESBL but in fact it is not ESBL and this is a lab error.  The organism is sensitive everything on antibiotic panel except for ampicillin and cefazolin.  Blood cultures have been negative and her white count on admission was 14 but with hydration came down to 0.3 since the hemoglobin dropped from 15-13.7 with hydration.  This has been a current problem locally with the urines and it is in the process of being corrected.  ID was asked to evaluate the situation.  Patient has been on ceftriaxone since 6/2/2025.    Past Medical History:        Diagnosis Date    Multiple sclerosis (HCC) 09/1996     Past Surgical History:        Procedure Laterality Date    BACLOFEN PUMP IMPLANTATION  01/2017     Current Medications:   Scheduled Meds:   sodium chloride flush  5-40 mL IntraVENous 2 times per day    cefTRIAXone (ROCEPHIN) IV  1,000 mg IntraVENous Q24H    morphine  2 mg IntraVENous Once    Baclofen  5 mg Oral BID     Continuous Infusions:   sodium chloride       PRN    related to artifactual in etiology given extensive motion.  Recent stroke   cannot be entirely excluded.  MRI recommended for further evaluation.   3. Subtle areas of decreased attenuation are present in the left frontal and   parietal lobes.   4. No acute intracranial hemorrhage.   5. Significantly limited CTA head due to motion.  No obvious arterial   occlusion.   6. No stenosis involving the carotid arteries or vertebral arteries.   7. Results were called by Dr. Jose Maria Mccauley to Dr. Dugan on 6/1/2025 at   9:29 p.m.         MRI BRAIN W WO CONTRAST    (Results Pending)   MRI CERVICAL SPINE W WO CONTRAST    (Results Pending)       Microbiology:  Pending  No results for input(s): \"BC\" in the last 72 hours.  No results for input(s): \"ORG\" in the last 72 hours.  No results for input(s): \"BLOODCULT2\" in the last 72 hours.  No results for input(s): \"STREPNEUMAGU\" in the last 72 hours.  No results for input(s): \"LP1UAG\" in the last 72 hours.  No results for input(s): \"ASO\" in the last 72 hours.  No results for input(s): \"CULTRESP\" in the last 72 hours.    Assessment:  Status post fall with patient with MS possible associated with urinary tract infection  This is not ESBL; there is a laboratory that has interpreted resistance to ampicillin and cefazolin as ESBL.  ESBL is noted when the antibiotic spectrum includes resistance to cefepime which is not the case and in fact it is sensitive to ceftriaxone to quinolones and carbapenems.    Plan:    Cont ceftriaxone; switch to orals tomorrow  Leukocytosis possibly related to the above  Lab and pharmacy made aware of the inconsistency in urine results  Check cultures  Baseline ESR, CRP  Monitor labs  Will follow with you    Thank you for having us see this patient in consultation. I will be discussing this case with the treating physicians.      Electronically signed by David Madden MD on 6/4/2025 at 10:46 AM

## 2025-06-04 NOTE — PLAN OF CARE
Problem: Discharge Planning  Goal: Discharge to home or other facility with appropriate resources  6/4/2025 0042 by Sita Moeller RN  Outcome: Progressing     Problem: ABCDS Injury Assessment  Goal: Absence of physical injury  6/4/2025 0042 by Sita Moeller RN  Outcome: Progressing     Problem: Confusion  Goal: Confusion, delirium, dementia, or psychosis is improved or at baseline  Description: INTERVENTIONS:1. Assess for possible contributors to thought disturbance, including medications, impaired vision or hearing, underlying metabolic abnormalities, dehydration, psychiatric diagnoses, and notify attending LIP2. [place] high risk fall precautions, as indicated3. Provide frequent short contacts to provide reality reorientation, refocusing and direction4. Decrease environmental stimuli, including noise as appropriate5. Monitor and intervene to maintain adequate nutrition, hydration, elimination, sleep and activity6. If unable to ensure safety without constant attention obtain sitter and review sitter guidelines with assigned personnel7. Initiate Psychosocial CNS and Spiritual Care consult, as indicated  INTERVENTIONS:1. Assess for possible contributors to thought disturbance, including medications, impaired vision or hearing, underlying metabolic abnormalities, dehydration, psychiatric diagnoses, and notify attending LIP2. [place] high risk fall precautions, as indicated3. Provide frequent short contacts to provide reality reorientation, refocusing and direction4. Decrease environmental stimuli, including noise as appropriate5. Monitor and intervene to maintain adequate nutrition, hydration, elimination, sleep and activity6. If unable to ensure safety without constant attention obtain sitter and review sitter guidelines with assigned personnel7. Initiate Psychosocial CNS and Spiritual Care consult, as indicated  INTERVENTIONS:1. Assess for possible contributors to thought disturbance, including medications,  sitter and review sitter guidelines with assigned personnel7. Initiate Psychosocial CNS and Spiritual Care consult, as indicated  6/4/2025 0042 by Sita Moeller, RN  Outcome: Progressing     Problem: Safety - Adult  Goal: Free from fall injury  6/4/2025 0042 by Sita Moeller, RN  Outcome: Progressing     Problem: Skin/Tissue Integrity  Goal: Skin integrity remains intact  Description: 1.  Monitor for areas of redness and/or skin breakdown2.  Assess vascular access sites hourly3.  Every 4-6 hours minimum:  Change oxygen saturation probe site4.  Every 4-6 hours:  If on nasal continuous positive airway pressure, respiratory therapy assess nares and determine need for appliance change or resting period  6/4/2025 0042 by Sita Moeller, RN  Outcome: Progressing     Problem: Pain  Goal: Verbalizes/displays adequate comfort level or baseline comfort level  6/4/2025 0042 by Sita Moeller, RN  Outcome: Progressing

## 2025-06-04 NOTE — PROGRESS NOTES
[facility]                                Progress note    Subjective:    The patient is awake and alert.    Resting in bed  Up and alert  Sitter at bedside   Seems more off today but answers appropriately    Objective:    BP (!) 149/95   Pulse 96   Temp 98.6 °F (37 °C) (Axillary)   Resp 17   Ht 1.524 m (5')   Wt 71 kg (156 lb 8.4 oz)   LMP 07/19/2017   SpO2 97%   BMI 30.57 kg/m²     In: 360 [P.O.:360]  Out: -   In: 360   Out: -     General appearance: NAD, conversant  HEENT: AT/NC, MMM  Neck: FROM, supple  Lungs: Clear to auscultation  CV: RRR, no MRGs  Vasc: Radial pulses 2+  Abdomen: Soft, non-tender; no masses or HSM  Extremities: No peripheral edema or digital cyanosis  Skin: no rash, lesions or ulcers  Psych: Alert and oriented to person, place and time  Neuro: Alert and interactive     Recent Labs     06/01/25 2028 06/03/25  0546 06/04/25  0606   WBC 14.0* 9.2 8.3   HGB 15.0 14.0 13.7   HCT 43.5 42.3 41.4    213 205       Recent Labs     06/01/25 2028 06/03/25  0546 06/04/25  0606    143 144   K 3.3* 3.9 4.2    106 109*   CO2 26 22 26   BUN 10 7 6   CREATININE 0.8 0.5 0.5   CALCIUM 9.5 9.3 9.2       ASSESSMENT:  Strokelike symptoms vs MS flare with CT of the head with recent stroke unable to be entirely excluded however limited due to motion artifact  Known Hx Multiple Sclerosis  S/p Baclofen pump (recently turned off)  Troponin elevation without chest pain or acute ischemic EKG changes  Hypokalemia, supplemented  Hyperglycemia with no known Hx DM  Leukocytosis           PLAN:  Await MRI results to assess for acute stroke  Significant UA-will initiate empiric IV antibiotic therapy as her confusion and symptoms may be from a urinary tract infection, await final cultures  Dual antiplatelet therapy with aspirin and Plavix, blood pressure control, high intensity statin   PT OT evaluation for rehab-though I am told she does not move around much at the nursing  facility at baseline  Neurology consulted  MRI of the brain and cervical spine pending  Check HA1C, FLP  Check magnesium  Monitor CMP, Mg (pending)  Repeat troponin  Check CK  Monitor CBC (pending)       6/3/25  - Resolution of hypokalemia, 3.9  -Resolution of leukocytosis, 9.2  -Discontinue IVF given hypertension  -Remains on IV Rocephin pending cultures  -Awaiting input from neurology  -Awaiting completion of MRI of the brain and cervical spine  -Await PT OT evaluations  - Bedside sitter could likely be discontinued it at  could be placed given patient is redirectable    6/4/25  - ID consulted for ESBL in urine culture  - ID recommends continue IV Rocephin  - Continue to monitor labs  - Unable to undergo MRI of the brain and cervical spine yesterday, will defer to neurology if needed  - Psych consulted, self-harm/suicidal ideations  - Improved BPs however still mildly elevated, may need low-dose antihypertensive, continue to monitor  - Await input from consultants    Code status: DNR-CC  Consultants:  neuro, ID and psych     DVT Prophylaxis SCD's  PT/OT  Discharge planning         ARNULFO Levin - CNP,  12:04 PM  6/4/2025

## 2025-06-04 NOTE — CONSULTS
Department of Psychiatry  Behavioral Health Consult    REASON FOR CONSULT: questionable SI     HISTORY OF PRESENT ILLNESS:    The patient is a 55 y.o. female who is , currently unemployed, and living at [facility] with significant past psychiatric history of depression on Prozac who presented to the ED on 6/1/25 for falling out of her bed with concern for CVA and worsening slurred speech over the past weeks that waxes and wanes and confusion. Additionally, she has a past medical history of multiple sclerosis, diagnosed in 1996, and is currently taking baclofen and Kesimpta. MRI showed no evidence for acute or active demyelination. UA was positive for E. Coli UTI. UDS was negative, ethanol was <10, and QTc was 471. Psychiatry was consulted for concerns of SI and self-harm. She has been to the ED twice in May with similar concerns of SI and self-harm. [facility] reportedly found shaving razors underneath her mattress and were concerned that her she wanted to harm herself after she she told staff that she was going to put a pillow over her head.      Upon evaluation of the patient today, she is seen in her room, pleasant, cooperative forthcoming with information. She is smiling and denies any depressed mood or symptoms. She denies taking Prozac currently, and says that she used to take it long ago but denies that she was feeling depressed when it was prescribed. She states that she is bored at the hospital. She adamantly denies trying to harm herself. Says that she loves herself and does not want to die. She was alert but unable to come up with the date. She knew that is was 2025 and was oriented to place. She also tells me that she lives alone in her house however she actually stays in assisted living. She is slightly confused and a poor historian.  History is limited at this time as patient is not reliably able to answer some questions.  Says that she does not have any psychiatric problem and

## 2025-06-04 NOTE — PROGRESS NOTES
Patient has been scheduled for their MRI Brain/Cervical as an outpatient. They have been given the date and time with instructions for testing. Date of testin2025 @ 0815 arrival. Anushka BRADFORD on floor has been notified that appointment has been made.

## 2025-06-04 NOTE — PROGRESS NOTES
Physical Therapy  Initial Assessment     Name: Frances Zurita  : 1970  MRN: [id number]      Date of Service: 2025    Evaluating PT: John Zafar, PT, DPT, [id number]      Room #:  8509/8509-B  Diagnosis:  Multiple sclerosis (HCC) [G35]  Stroke-like symptoms [R29.90]  PMHx/PSHx:   has a past medical history of Multiple sclerosis (HCC).  Procedure/Surgery:  N/A  Precautions:  Fall risk, alarm, sitter, MS history,   Equipment Needs:  TBD    SUBJECTIVE:    Pt lives at [facility] where she reports getting tadeo lifted to . Pt says she spends a majority of time in the bed.    OBJECTIVE:   Initial Evaluation  Date: 25 Treatment Date: Short Term/ Long Term   Goals   AM-PAC 6 Clicks      Was pt agreeable to Eval/treatment? yes     Does pt have pain? No pain reported     Bed Mobility  Rolling: ModA  Supine to sit: MaxA x2  Sit to supine: MaxA x2  Scooting: MaxA  Rolling: SBA  Supine to sit: SBA  Sit to supine: SBA  Scooting: SBA   Transfers NT, pt a tadeo lift at baseline     Ambulation   NT     Stair negotiation: ascended and descended NT     ROM BUE: Refer to OT note  BLE: WFL     Strength BUE: Refer to OT note  BLE: 3/5     Balance Sitting EOB: Katia  Dynamic Standing: NT  Sitting EOB: Independent  Dynamic Standing:     Pt is A & O x: x4, answered all questions  Sensation: no reports of numbness or tingling  Edema: N/A    Patient education  Pt educated on Pt educated on role and benefits of PT in acute care setting.     Patient response to education:   Pt verbalized understanding Pt demonstrated skill Pt requires further education in this area   yes yes no     ASSESSMENT:    Conditions Requiring Skilled Therapeutic Intervention:    [x]Decreased strength     []Decreased ROM  [x]Decreased functional mobility  [x]Decreased balance   [x]Decreased endurance   [x]Decreased posture  [x]Decreased sensation  [x]Decreased coordination   [x]Decreased vision  [x]Decreased safety awareness   []Increased pain

## 2025-06-04 NOTE — PROGRESS NOTES
OCCUPATIONAL THERAPY INITIAL EVALUATION    [facility] [address] [place], [place]       Date:2025                                                  Patient Name: Frances Zurita  MRN: [id number]  : 1970  Room: 8508509-    Evaluating OT: Jasen Patel OTR/L [id number]    Referring Provider: Rakel Howard APRN - CNP      Specific Provider Orders/Date: OT evaluation and treatment 6/3/25 1245    Diagnosis:  Multiple sclerosis (HCC) [G35]  Stroke-like symptoms [R29.90]      Pertinent Medical History:  has a past medical history of Multiple sclerosis (HCC).   Past Surgical History:   Procedure Laterality Date    BACLOFEN PUMP IMPLANTATION  2017       Pt presented to the hospital on  with slurred speech and a fall out of bed     Orders received, chart reviewed, eval complete.     Precautions:  Fall Risk, sitter, hx of ms     Assessment of current deficits    [x] Functional mobility   [x]ADLs  [x] Strength               []Cognition   [x] Functional transfers   [x] IADLs         [x] Safety Awareness   [x]Endurance   [] Fine Motor Coordination [x] Balance [] Vision/perception   []Sensation    [] Gross Motor Coordination [] ROM  [] Delirium                  [] Motor Control     OT PLAN OF CARE   OT POC based on physician orders, patient diagnosis and results of clinical assessment    Frequency/Duration 1-3 days/wk for 2 weeks PRN   Specific OT Treatment to include:   * Instruction/training on adapted ADL techniques and AE recommendations to increase functional independence within precautions       * Training on energy conservation strategies, correct breathing pattern and techniques to improve independence/tolerance for self-care routine  * Functional transfer/mobility training/DME recommendations for increased independence, safety, and fall prevention  * Patient/Family education to increase follow through with safety techniques and  Co-morbidities affecting occupational performance  Modification or assistance to complete eval    Low Complexity   1 to 3 []  Low []  None []  None []   Moderate Complexity   3 to 5 [x]  Mod []  Maybe []  Min to Mod []   High Complexity   5 or more []  High [x]  Yes [x]  Max [x]     The above evaluation is classified as moderate  complexity based off the noted performance deficits, personal factors, co-morbidities, assistance required, and other factors as noted in the clinical evaluation and functional testing.     Time In: 1200  Time Out: 1223  Total Treatment Time: 8 minutes    Min Units   OT Eval Low 65812       OT Eval Moderate 97166  x 1   OT Eval High 33783       OT Re-Eval 02019       Therapeutic Ex 03602       Therapeutic Activities 82472      ADL/Self Care 13470  8 1   Orthotic Management 21041       Neuro Re-Ed 02204       Non-Billable Time          Evaluation Time includes thorough review of current medical information, gathering information on past medical history/social history and prior level of function, completion of standardized testing/informal observation of tasks, assessment of data and education on plan of care and goals.          Jasen Patel OTR/L [id number]

## 2025-06-05 VITALS
HEART RATE: 96 BPM | WEIGHT: 155.2 LBS | OXYGEN SATURATION: 98 % | RESPIRATION RATE: 20 BRPM | TEMPERATURE: 97.5 F | BODY MASS INDEX: 30.47 KG/M2 | DIASTOLIC BLOOD PRESSURE: 84 MMHG | HEIGHT: 60 IN | SYSTOLIC BLOOD PRESSURE: 136 MMHG

## 2025-06-05 LAB
ANION GAP SERPL CALCULATED.3IONS-SCNC: 12 MMOL/L (ref 7–16)
BASOPHILS # BLD: 0.07 K/UL (ref 0–0.2)
BASOPHILS NFR BLD: 1 % (ref 0–2)
BUN SERPL-MCNC: 6 MG/DL (ref 6–20)
CALCIUM SERPL-MCNC: 9.5 MG/DL (ref 8.6–10)
CHLORIDE SERPL-SCNC: 106 MMOL/L (ref 98–107)
CO2 SERPL-SCNC: 27 MMOL/L (ref 22–29)
CREAT SERPL-MCNC: 0.5 MG/DL (ref 0.5–1)
EOSINOPHIL # BLD: 0.33 K/UL (ref 0.05–0.5)
EOSINOPHILS RELATIVE PERCENT: 5 % (ref 0–6)
ERYTHROCYTE [DISTWIDTH] IN BLOOD BY AUTOMATED COUNT: 13.9 % (ref 11.5–15)
GFR, ESTIMATED: >90 ML/MIN/1.73M2
GLUCOSE SERPL-MCNC: 97 MG/DL (ref 74–99)
HCT VFR BLD AUTO: 45 % (ref 34–48)
HGB BLD-MCNC: 15 G/DL (ref 11.5–15.5)
IMM GRANULOCYTES # BLD AUTO: 0.07 K/UL (ref 0–0.58)
IMM GRANULOCYTES NFR BLD: 1 % (ref 0–5)
LYMPHOCYTES NFR BLD: 1.38 K/UL (ref 1.5–4)
LYMPHOCYTES RELATIVE PERCENT: 19 % (ref 20–42)
MCH RBC QN AUTO: 30.1 PG (ref 26–35)
MCHC RBC AUTO-ENTMCNC: 33.3 G/DL (ref 32–34.5)
MCV RBC AUTO: 90.2 FL (ref 80–99.9)
MONOCYTES NFR BLD: 0.85 K/UL (ref 0.1–0.95)
MONOCYTES NFR BLD: 12 % (ref 2–12)
NEUTROPHILS NFR BLD: 62 % (ref 43–80)
NEUTS SEG NFR BLD: 4.46 K/UL (ref 1.8–7.3)
PLATELET # BLD AUTO: 231 K/UL (ref 130–450)
PMV BLD AUTO: 11.8 FL (ref 7–12)
POTASSIUM SERPL-SCNC: 3.9 MMOL/L (ref 3.5–5.1)
RBC # BLD AUTO: 4.99 M/UL (ref 3.5–5.5)
SODIUM SERPL-SCNC: 144 MMOL/L (ref 136–145)
WBC OTHER # BLD: 7.2 K/UL (ref 4.5–11.5)

## 2025-06-05 PROCEDURE — 6370000000 HC RX 637 (ALT 250 FOR IP): Performed by: SPECIALIST

## 2025-06-05 PROCEDURE — 97129 THER IVNTJ 1ST 15 MIN: CPT

## 2025-06-05 PROCEDURE — 80048 BASIC METABOLIC PNL TOTAL CA: CPT

## 2025-06-05 PROCEDURE — 85025 COMPLETE CBC W/AUTO DIFF WBC: CPT

## 2025-06-05 PROCEDURE — 6370000000 HC RX 637 (ALT 250 FOR IP)

## 2025-06-05 PROCEDURE — 92526 ORAL FUNCTION THERAPY: CPT

## 2025-06-05 PROCEDURE — 36415 COLL VENOUS BLD VENIPUNCTURE: CPT

## 2025-06-05 RX ORDER — FLUOXETINE 10 MG/1
10 TABLET, FILM COATED ORAL DAILY
DISCHARGE
Start: 2025-06-06

## 2025-06-05 RX ORDER — CEFDINIR 300 MG/1
300 CAPSULE ORAL 2 TIMES DAILY
DISCHARGE
Start: 2025-06-05 | End: 2025-06-10

## 2025-06-05 RX ORDER — CEFDINIR 300 MG/1
300 CAPSULE ORAL EVERY 12 HOURS SCHEDULED
Status: DISCONTINUED | OUTPATIENT
Start: 2025-06-05 | End: 2025-06-06 | Stop reason: HOSPADM

## 2025-06-05 RX ADMIN — BACLOFEN 5 MG: 10 TABLET ORAL at 09:32

## 2025-06-05 RX ADMIN — BACLOFEN 5 MG: 10 TABLET ORAL at 21:21

## 2025-06-05 RX ADMIN — CEFDINIR 300 MG: 300 CAPSULE ORAL at 12:03

## 2025-06-05 NOTE — DISCHARGE SUMMARY
[facility]   Discharge summary   Patient ID:  Frances Zurita  [id number]  55 y.o.  1970    Admit date: 6/1/2025    Discharge date and time: 6/5/2025    Admission Diagnoses:   Patient Active Problem List   Diagnosis    Multiple sclerosis (HCC)    Recurrent major depressive disorder, in remission    Vitamin D deficiency    Generalized anxiety disorder    Stroke-like symptoms       Discharge Diagnoses: ***    Consults: {consultation:16658}    Procedures: ***    Hospital Course: The patient is a 55 y.o. female of Rg Fan DO with significant past medical history of *** who presents with ***    Recent Labs     06/03/25  0546 06/04/25  0606 06/05/25  0540   WBC 9.2 8.3 7.2   HGB 14.0 13.7 15.0   HCT 42.3 41.4 45.0    205 231       Recent Labs     06/03/25  0546 06/04/25  0606 06/05/25  0540    144 144   K 3.9 4.2 3.9    109* 106   CO2 22 26 27   BUN 7 6 6   CREATININE 0.5 0.5 0.5   CALCIUM 9.3 9.2 9.5       XR CHEST PORTABLE  Result Date: 6/2/2025  EXAMINATION: ONE XRAY VIEW OF THE CHEST 6/2/2025 2:27 am COMPARISON: None. HISTORY: ORDERING SYSTEM PROVIDED HISTORY: Leukocytosis TECHNOLOGIST PROVIDED HISTORY: Reason for exam:->Leukocytosis FINDINGS: No focal consolidation.  No pneumothorax or pleural effusion. Normal cardiac silhouette. No acute osseous abnormality.     No focal consolidation.     CT HEAD WO CONTRAST  Result Date: 6/1/2025  EXAMINATION: CTA OF THE HEAD WITH CONTRAST WITH PERFUSION; CTA OF THE NECK; CTA OF THE HEAD WITH CONTRAST; CT OF THE HEAD WITHOUT CONTRAST 6/1/2025 9:00 pm: TECHNIQUE: CTA of the head/brain was performed with the administration of intravenous contrast. Multiplanar reformatted images are provided for review.  MIP images are provided for review. Automated exposure control, iterative reconstruction, and/or weight based adjustment of the mA/kV was utilized to reduce the radiation dose to as low as reasonably achievable.; CTA of the neck was performed  is 2 mL (CBF<30% volume). TOTAL HYPOPERFUSION: The total area of hypoperfusion is 39 mL (Tmax>6s volume). PENUMBRA: The penumbra (mismatch) volume is 37 mL and is located in the   The mismatch ratio is 19.5. CT head: There is subtle decreased attenuation in left frontal and left parietal white matter. No acute intracranial hemorrhage.  No hydrocephalus.  No abnormal extra-axial fluid collections.  Overall limited exam due to motion. CTA head: Limited examination due to motion.  Anterior cerebral arteries are patent. Middle cerebral arteries are patent.  Posterior cerebral arteries are patent. No obvious stenosis involving the basilar artery. CTA neck: Common carotid arteries are patent.  Carotid bulbs are unremarkable.  No evidence of stenosis involving proximal or distal cervical internal carotid arteries.  Both vertebral arteries are patent.     1. Limited exam due to motion. 2. Abnormal perfusion in left frontal and left parietal lobes which may be related to artifactual in etiology given extensive motion.  Recent stroke cannot be entirely excluded.  MRI recommended for further evaluation. 3. Subtle areas of decreased attenuation are present in the left frontal and parietal lobes. 4. No acute intracranial hemorrhage. 5. Significantly limited CTA head due to motion.  No obvious arterial occlusion. 6. No stenosis involving the carotid arteries or vertebral arteries. 7. Results were called by Dr. Jose Maria Mccauley to Dr. Dugan on 6/1/2025 at 9:29 p.m.     CT BRAIN PERFUSION  Result Date: 6/1/2025  EXAMINATION: CTA OF THE HEAD WITH CONTRAST WITH PERFUSION; CTA OF THE NECK; CTA OF THE HEAD WITH CONTRAST; CT OF THE HEAD WITHOUT CONTRAST 6/1/2025 9:00 pm: TECHNIQUE: CTA of the head/brain was performed with the administration of intravenous contrast. Multiplanar reformatted images are provided for review.  MIP images are provided for review. Automated exposure control, iterative reconstruction, and/or weight based

## 2025-06-05 NOTE — DISCHARGE INSTR - COC
Continuity of Care Form    Patient Name: Frances Zurita   :  1970  MRN:  [id number]    Admit date:  2025  Discharge date:  ***    Code Status Order: DNR-CC   Advance Directives:     Admitting Physician:  Kami De Los Santos MD  PCP: Rg Fan DO    Discharging Nurse: ***  Discharging Hospital Unit/Room#: 8509/8509-B  Discharging Unit Phone Number: ***    Emergency Contact:   Extended Emergency Contact Information  Primary Emergency Contact: Ary Fabian  Home Phone: [phone]  Mobile Phone: [phone]  Relation: Brother/Sister   needed? No  Secondary Emergency Contact: Paulette Zurita   [facility]  Home Phone: [phone]  Relation: Parent    Past Surgical History:  Past Surgical History:   Procedure Laterality Date    BACLOFEN PUMP IMPLANTATION  2017       Immunization History:     There is no immunization history on file for this patient.    Active Problems:  Patient Active Problem List   Diagnosis Code    Multiple sclerosis (HCC) G35    Recurrent major depressive disorder, in remission F33.40    Vitamin D deficiency E55.9    Generalized anxiety disorder F41.1    Stroke-like symptoms R29.90       Isolation/Infection:   Isolation            Contact          Patient Infection Status    No active infections.   Resolved       Infection Onset Added Last Indicated Last Indicated By Resolved Resolved By    ESBL (Extended Spectrum Beta Lactamase) 25 Culture, Urine 25 Lorna Crowley, RN    Not a ESBL-per Dr Madden                    Nurse Assessment:  Last Vital Signs: /89   Pulse 88   Temp 98.1 °F (36.7 °C) (Temporal)   Resp 18   Ht 1.524 m (5')   Wt 71 kg (156 lb 8.4 oz)   LMP 2017   SpO2 98%   BMI 30.57 kg/m²     Last documented pain score (0-10 scale): Pain Level: 7  Last Weight:   Wt Readings from Last 1 Encounters:   25 71 kg (156 lb 8.4 oz)     Mental Status:  {IP PT MENTAL STATUS:}    IV Access:  { JANE IV      Discharging to Facility/ Agency   Name: Meenu tong  Address:  Phone:  Fax:    Dialysis Facility (if applicable)   Name:  Address:  Dialysis Schedule:  Phone:  Fax:    / signature: {Esignature:[id number]}    PHYSICIAN SECTION    Prognosis: {Prognosis:4895545918}    Condition at Discharge: { Patient Condition:044989820}    Rehab Potential (if transferring to Rehab): {Prognosis:4096024833}    Recommended Labs or Other Treatments After Discharge: ***    Physician Certification: I certify the above information and transfer of Frances Zurita  is necessary for the continuing treatment of the diagnosis listed and that she requires {Admit to Appropriate Level of Care:31830} for {GREATER/LESS:984109541} 30 days.     Update Admission H&P: {CHP DME Changes in HandP:784509887}    PHYSICIAN SIGNATURE:  {Esignature:564627654}

## 2025-06-05 NOTE — PROGRESS NOTES
[facility]  [facility]  Progress Note      Chief Complaint   Patient presents with    Cerebrovascular Accident     LKW \"sometime last night\" Pt fell out of bed last night, denies hitting head. Hx MS. Tonight pt began having slurred speech. No thinners       SUBJECTIVE:  Patient is tolerating medications. No reported adverse drug reactions.  No nausea, vomiting, diarrhea.  Appears  more alert today  Review of systems:  As stated above in the chief complaint, otherwise negative.    Medications:  Scheduled Meds:   FLUoxetine  10 mg Oral Daily    sodium chloride flush  5-40 mL IntraVENous 2 times per day    cefTRIAXone (ROCEPHIN) IV  1,000 mg IntraVENous Q24H    morphine  2 mg IntraVENous Once    Baclofen  5 mg Oral BID     Continuous Infusions:   sodium chloride       PRN Meds:sodium chloride flush, sodium chloride, magnesium sulfate, acetaminophen **OR** acetaminophen, bisacodyl, ketorolac    OBJECTIVE:  /72   Pulse 77   Temp 97.9 °F (36.6 °C) (Oral)   Resp 18   Ht 1.524 m (5')   Wt 70.4 kg (155 lb 3.3 oz)   LMP 2017   SpO2 96%   BMI 30.31 kg/m²   Temp  Av.8 °F (36.6 °C)  Min: 97.1 °F (36.2 °C)  Max: 98.6 °F (37 °C)  Constitutional: The patient is awake, alert, and oriented.   Skin: Warm and dry. No rashes were noted.   HEENT: Round and reactive pupils.  Moist mucous membranes.  No ulcerations or thrush.  Neck: Supple to movements.   Chest: No use of accessory muscles to breathe. Symmetrical expansion.  No wheezing, crackles or rhonchi.  Cardiovascular: S1 and S2 are rhythmic and regular. No murmurs appreciated.   Abdomen: Positive bowel sounds to auscultation. Benign to palpation. No masses felt. No hepatosplenomegaly.  Genitourinary: female  Extremities: No clubbing, no cyanosis, no edema.  Lines: peripheral    Laboratory and Tests Review:  Lab Results   Component Value Date    WBC 7.2 2025    WBC 8.3 2025    WBC 9.2 2025    HGB 15.0 2025

## 2025-06-05 NOTE — PLAN OF CARE
Problem: Discharge Planning  Goal: Discharge to home or other facility with appropriate resources  Outcome: Progressing  Flowsheets (Taken 6/4/2025 2231)  Discharge to home or other facility with appropriate resources:   Identify barriers to discharge with patient and caregiver   Arrange for needed discharge resources and transportation as appropriate   Identify discharge learning needs (meds, wound care, etc)     Problem: ABCDS Injury Assessment  Goal: Absence of physical injury  Outcome: Progressing

## 2025-06-05 NOTE — CARE COORDINATION
Transition of care.Per chart review- From [facility] after a fall.. Pt has hx of MS  Neurology consult- Mri to be done outpt with anesthesia. Has scheduled appt- Date of testin2025 @ 0815 arrival.On po marion   Spoke with Karon Liaison with Di[facility]nt[facility] of above and of psych consult note.[place] return [place] as [place]  [place] and tadeo lift .  LESLIE Envelope and ambulance form in soft chart. . Envelope and ambulance form in soft chart. Electronically signed by Marina Carrillo RN on 2025 at 10:07 AM    Call from patients sister Ary - answered what questions she had and then messaged Sadia GARCIA to call her with an update. Discharge order noted [facility] set up for 5 30 PM today. Ntfd Karon and placed thru [facility]. Bedside rn and patient and sister Ary ntfd. Electronically signed by Marina Carrillo RN on 2025 at 2:08 PM

## 2025-06-05 NOTE — PROGRESS NOTES
Pt will have MRI brain and C spine under anesthesia this has been ordered and will be completed on 6/20/25.      No further Neuro workup at this time  OP follow up with her current Neurologist   Neurology will sign off

## 2025-06-05 NOTE — PROGRESS NOTES
[facility]                                Progress note    Subjective:    Resting in bed   LESLIE at bedside     Objective:    /85   Pulse 85   Temp 97.9 °F (36.6 °C) (Oral)   Resp 20   Ht 1.524 m (5')   Wt 70.4 kg (155 lb 3.3 oz)   LMP 07/19/2017   SpO2 95%   BMI 30.31 kg/m²     No intake/output data recorded.  No intake/output data recorded.    General appearance: NAD, conversant  HEENT: AT/NC, MMM  Neck: FROM, supple  Lungs: Clear to auscultation  CV: RRR, no MRGs  Vasc: Radial pulses 2+  Abdomen: Soft, non-tender; no masses or HSM  Extremities: No peripheral edema or digital cyanosis  Skin: no rash, lesions or ulcers  Psych: Alert and oriented to person, place and time, intermit confusion noted   Neuro: Alert and interactive     Recent Labs     06/03/25  0546 06/04/25  0606 06/05/25  0540   WBC 9.2 8.3 7.2   HGB 14.0 13.7 15.0   HCT 42.3 41.4 45.0    205 231       Recent Labs     06/03/25  0546 06/04/25  0606 06/05/25  0540    144 144   K 3.9 4.2 3.9    109* 106   CO2 22 26 27   BUN 7 6 6   CREATININE 0.5 0.5 0.5   CALCIUM 9.3 9.2 9.5       ASSESSMENT:  Strokelike symptoms vs MS flare with CT of the head with recent stroke unable to be entirely excluded however limited due to motion artifact  Known Hx Multiple Sclerosis  S/p Baclofen pump (recently turned off)  Troponin elevation without chest pain or acute ischemic EKG changes  Hypokalemia, supplemented  Hyperglycemia with no known Hx DM  Leukocytosis           PLAN:  Await MRI results to assess for acute stroke  Significant UA-will initiate empiric IV antibiotic therapy as her confusion and symptoms may be from a urinary tract infection, await final cultures  Dual antiplatelet therapy with aspirin and Plavix, blood pressure control, high intensity statin   PT OT evaluation for rehab-though I am told she does not move around much at the nursing facility at baseline  Neurology consulted  MRI of the brain and

## 2025-06-05 NOTE — PROGRESS NOTES
SPEECH LANGUAGE PATHOLOGY  DAILY PROGRESS NOTE        PATIENT NAME:  Frances Zurita      :  1970          TODAY'S DATE:  2025 ROOM:  [address] [place], [place] [address]    Patient was seen for skilled cognitive-lingustic therapy. Patient appropriately answered 5/6 orientation question correctly. She answered 4/4 memory questions correctly. Patient completed spaced retrieval, recalling 3/3 words immediately and 2/3 after 3 minutes. Patient answered 4/5 yes/no questions correctly, and followed 1,2 and 3 step directions with 100% accuracy. Patient answered problem solving and WH- questions with 100% accuracy, requiring intermittent repetitions or clarification. Continue with POC.     CPT code(s) 11102  therapeutic interventions that focus on cognitive function , initial  15 min  Total minutes :  15 minutes     Patient was seen for dysphagia mgmt. Patient reported she feels her swallow function is back to her baseline and reports no instances of coughing with her meals following MBSS. Patient was positioned upright in bed and consumed thin liquids via cup. Patient oral phase displayed AP propulsion and bolus containment within functional limits. Patient pharyngeal phase displayed no overt signs/symptoms of aspiration. Patient was educated on dysphagia strategies small bites and sips, slow rate of intake, upright positioning, alternating liquids and solids, and using a liquid wash to clear residue. Patient was educated on use of compensatory strategies and energy conservation techniques to reduce dysphagia s/s. No therapeutic exercises recommended at this time. SLP educated patient that due to the progressive nature of MS she should closely monitor her swallow function and report any changes to the nursing staff at her facility as a repeat MBSS could be beneficial. Patient verbalized understanding and agreement.       CPT code(s) 68488  dysphagia tx  Total minutes :  15 minutes     Debbie Low  Speech Pathology  Student

## 2025-06-07 LAB
MICROORGANISM SPEC CULT: NORMAL
MICROORGANISM SPEC CULT: NORMAL
SERVICE CMNT-IMP: NORMAL
SERVICE CMNT-IMP: NORMAL
SPECIMEN DESCRIPTION: NORMAL
SPECIMEN DESCRIPTION: NORMAL

## 2025-06-20 ENCOUNTER — HOSPITAL ENCOUNTER (OUTPATIENT)
Dept: MRI IMAGING | Age: 55
Discharge: HOME | End: 2025-06-22
Payer: COMMERCIAL

## 2025-06-20 ENCOUNTER — ANESTHESIA EVENT (OUTPATIENT)
Dept: MRI IMAGING | Age: 55
End: 2025-06-20
Payer: COMMERCIAL

## 2025-06-20 ENCOUNTER — ANESTHESIA (OUTPATIENT)
Dept: MRI IMAGING | Age: 55
End: 2025-06-20
Payer: COMMERCIAL

## 2025-06-20 VITALS
SYSTOLIC BLOOD PRESSURE: 135 MMHG | RESPIRATION RATE: 18 BRPM | HEART RATE: 89 BPM | DIASTOLIC BLOOD PRESSURE: 83 MMHG | OXYGEN SATURATION: 96 %

## 2025-06-20 VITALS
HEART RATE: 86 BPM | OXYGEN SATURATION: 95 % | DIASTOLIC BLOOD PRESSURE: 74 MMHG | RESPIRATION RATE: 18 BRPM | SYSTOLIC BLOOD PRESSURE: 133 MMHG

## 2025-06-20 DIAGNOSIS — G35 MULTIPLE SCLEROSIS (HCC): ICD-10-CM

## 2025-06-20 DIAGNOSIS — R29.90 STROKE-LIKE SYMPTOMS: ICD-10-CM

## 2025-06-20 PROCEDURE — 72156 MRI NECK SPINE W/O & W/DYE: CPT

## 2025-06-20 PROCEDURE — 3700000000 HC ANESTHESIA ATTENDED CARE

## 2025-06-20 PROCEDURE — 70553 MRI BRAIN STEM W/O & W/DYE: CPT

## 2025-06-20 PROCEDURE — 7100000011 HC PHASE II RECOVERY - ADDTL 15 MIN

## 2025-06-20 PROCEDURE — 2580000003 HC RX 258

## 2025-06-20 PROCEDURE — A9579 GAD-BASE MR CONTRAST NOS,1ML: HCPCS | Performed by: RADIOLOGY

## 2025-06-20 PROCEDURE — 6360000002 HC RX W HCPCS

## 2025-06-20 PROCEDURE — 3700000001 HC ADD 15 MINUTES (ANESTHESIA)

## 2025-06-20 PROCEDURE — 2500000003 HC RX 250 WO HCPCS

## 2025-06-20 PROCEDURE — 6360000004 HC RX CONTRAST MEDICATION: Performed by: RADIOLOGY

## 2025-06-20 PROCEDURE — 7100000010 HC PHASE II RECOVERY - FIRST 15 MIN

## 2025-06-20 RX ORDER — SODIUM CHLORIDE 9 MG/ML
INJECTION, SOLUTION INTRAVENOUS
Status: DISCONTINUED | OUTPATIENT
Start: 2025-06-20 | End: 2025-06-20

## 2025-06-20 RX ORDER — PROPOFOL 10 MG/ML
INJECTION, EMULSION INTRAVENOUS
Status: DISCONTINUED | OUTPATIENT
Start: 2025-06-20 | End: 2025-06-20 | Stop reason: SDUPTHER

## 2025-06-20 RX ORDER — MIDAZOLAM HYDROCHLORIDE 1 MG/ML
INJECTION, SOLUTION INTRAMUSCULAR; INTRAVENOUS
Status: DISCONTINUED | OUTPATIENT
Start: 2025-06-20 | End: 2025-06-20 | Stop reason: SDUPTHER

## 2025-06-20 RX ORDER — DEXMEDETOMIDINE HYDROCHLORIDE 100 UG/ML
INJECTION, SOLUTION INTRAVENOUS
Status: DISCONTINUED | OUTPATIENT
Start: 2025-06-20 | End: 2025-06-20 | Stop reason: SDUPTHER

## 2025-06-20 RX ORDER — SODIUM CHLORIDE 0.9 % (FLUSH) 0.9 %
5-40 SYRINGE (ML) INJECTION PRN
OUTPATIENT
Start: 2025-06-20

## 2025-06-20 RX ORDER — NALOXONE HYDROCHLORIDE 0.4 MG/ML
INJECTION, SOLUTION INTRAMUSCULAR; INTRAVENOUS; SUBCUTANEOUS PRN
OUTPATIENT
Start: 2025-06-20

## 2025-06-20 RX ORDER — SODIUM CHLORIDE 9 MG/ML
INJECTION, SOLUTION INTRAVENOUS PRN
OUTPATIENT
Start: 2025-06-20

## 2025-06-20 RX ORDER — KETAMINE HYDROCHLORIDE 10 MG/ML
INJECTION, SOLUTION INTRAMUSCULAR; INTRAVENOUS
Status: DISCONTINUED | OUTPATIENT
Start: 2025-06-20 | End: 2025-06-20 | Stop reason: SDUPTHER

## 2025-06-20 RX ORDER — SODIUM CHLORIDE 9 MG/ML
INJECTION, SOLUTION INTRAVENOUS
Status: DISCONTINUED | OUTPATIENT
Start: 2025-06-20 | End: 2025-06-20 | Stop reason: SDUPTHER

## 2025-06-20 RX ORDER — GLYCOPYRROLATE 1 MG/5 ML
SYRINGE (ML) INTRAVENOUS
Status: DISCONTINUED | OUTPATIENT
Start: 2025-06-20 | End: 2025-06-20 | Stop reason: SDUPTHER

## 2025-06-20 RX ORDER — SODIUM CHLORIDE 0.9 % (FLUSH) 0.9 %
5-40 SYRINGE (ML) INJECTION EVERY 12 HOURS SCHEDULED
OUTPATIENT
Start: 2025-06-20

## 2025-06-20 RX ORDER — GADOTERIDOL 279.3 MG/ML
15 INJECTION INTRAVENOUS
Status: COMPLETED | OUTPATIENT
Start: 2025-06-20 | End: 2025-06-20

## 2025-06-20 RX ADMIN — Medication 30 MG: at 10:15

## 2025-06-20 RX ADMIN — Medication 20 MG: at 10:26

## 2025-06-20 RX ADMIN — GADOTERIDOL 15 ML: 279.3 INJECTION, SOLUTION INTRAVENOUS at 12:02

## 2025-06-20 RX ADMIN — PROPOFOL 50 MCG/KG/MIN: 10 INJECTION, EMULSION INTRAVENOUS at 10:15

## 2025-06-20 RX ADMIN — SODIUM CHLORIDE: 9 INJECTION, SOLUTION INTRAVENOUS at 10:13

## 2025-06-20 RX ADMIN — MIDAZOLAM 2 MG: 1 INJECTION INTRAMUSCULAR; INTRAVENOUS at 10:15

## 2025-06-20 RX ADMIN — DEXMEDETOMIDINE HCL 12 MCG: 100 INJECTION INTRAVENOUS at 10:15

## 2025-06-20 RX ADMIN — Medication 0.2 MG: at 10:15

## 2025-06-20 NOTE — ANESTHESIA PRE PROCEDURE
Department of Anesthesiology  Preprocedure Note       Name:  Frances Zurita   Age:  55 y.o.  :  1970                                          MRN:  [id number]         Date:  2025      Surgeon: RIKKI RADIOLOGY ANESTHESIOLOGIST     Procedure: MRI CERVICAL SPINE W WO CONTRAST     Medications prior to admission:   Prior to Admission medications    Medication Sig Start Date End Date Taking? Authorizing Provider   FLUoxetine (PROZAC) 10 MG tablet Take 1 tablet by mouth daily 25   Rakel Howard APRN - CNP   Baclofen (LIORESAL) 5 MG tablet Take 1 tablet by mouth 2 times daily    ProviderGagan MD   benzocaine-menthol (CEPACOL SORE THROAT) 15-3.6 MG lozenge Take 1 lozenge by mouth every 2 hours as needed for Sore Throat    ProviderGagan MD   Ofatumumab (KESIMPTA) 20 MG/0.4ML SOAJ Inject 20 mg into the skin every 30 days    ProviderGagan MD   magnesium hydroxide (MILK OF MAGNESIA) 400 MG/5ML suspension Take 30 mLs by mouth daily as needed for Constipation    ProviderGagan MD   Dextromethorphan-guaiFENesin  MG/5ML SYRP Take 5 mLs by mouth every 4 hours as needed for Cough    ProviderGagan MD   loratadine (CLARITIN) 10 MG tablet Take 1 tablet by mouth daily as needed (allergy)    ProviderGagan MD   bisacodyl (DULCOLAX) 5 MG EC tablet Take 1 tablet by mouth daily as needed for Constipation    ProviderGagan MD   aluminum & magnesium hydroxide-simethicone (MYLANTA) 400-400-40 MG/5ML SUSP Take 30 mLs by mouth every 4 hours as needed (epigastric discomfort)    ProviderGagan MD   loperamide (IMODIUM) 2 MG capsule Take 1 capsule by mouth every 3 hours as needed for Diarrhea    ProviderGagan MD   acetaminophen (TYLENOL) 325 MG tablet Take 2 tablets by mouth every 4 hours as needed for Pain    ProviderGagan MD       Current medications:    Current Outpatient Medications   Medication Sig Dispense Refill    FLUoxetine (PROZAC) 10 MG tablet

## 2025-06-20 NOTE — PROCEDURES
PROCEDURE NOTE  Date: 6/20/2025   Name: Frances Zurita  YOB: 1970    Procedures    1145 Patient returned from procedure. Patient stable. No s/s of complications noted or reported. Vitals will be checked q 15min, see flow sheets.

## 2025-06-20 NOTE — PROGRESS NOTES
Patient refusing to leave SpO2 monitor on. Answers \"yes\" to wanting to go home. States \"yes\" to knowing her birthday, refusing to answer.

## 2025-06-20 NOTE — ANESTHESIA POSTPROCEDURE EVALUATION
Department of Anesthesiology  Postprocedure Note    Patient: Frances Zurita  MRN: [id number]  YOB: 1970  Date of evaluation: 6/20/2025    Procedure Summary       Date: 06/20/25 Room / Location: [facility] MRI; [facility] Radiology    Anesthesia Start: 1009 Anesthesia Stop:     Procedure: MRI CERVICAL SPINE W WO CONTRAST Diagnosis:       Multiple sclerosis (HCC)      Stroke-like symptoms    Scheduled Providers: Romel Carrasco APRN - CRNA Responsible Provider: Nael Edouard DO    Anesthesia Type: MAC ASA Status: 3            Anesthesia Type: No value filed.    Cas Phase I:      Cas Phase II:      Anesthesia Post Evaluation    Patient location during evaluation: PACU  Patient participation: complete - patient participated  Level of consciousness: awake and alert  Pain score: 1  Airway patency: patent  Nausea & Vomiting: no nausea and no vomiting  Cardiovascular status: hemodynamically stable  Respiratory status: acceptable  Hydration status: euvolemic  Pain management: adequate    No notable events documented.

## 2025-06-20 NOTE — PROGRESS NOTES
Patient discharged, site was checked with every set of vitals. Discharge papers reviewed with patient, questions answered. Patient taken to door via wheelchair. Patient in stable condition.

## (undated) DEVICE — DRESSING, GAUZE, 16 PLY, 4 X 4 IN, STERILE

## (undated) DEVICE — DRAPE COVER, C ARM, FLOUROSCAN IMAGING SYS

## (undated) DEVICE — SYRINGE, LUER LOCK, 12ML

## (undated) DEVICE — TIP, SUCTION, YANKAUER, W/O VENT, FLEXIBLE, OPEN TIP, HIGH CAPACITY

## (undated) DEVICE — COVER, CART, 45 X 27 X 48 IN, CLEAR

## (undated) DEVICE — DRAPE, C-ARMOR KIT

## (undated) DEVICE — DRAPE, TOWEL, STERI DRAPE, 17 X 11 IN, PLASTIC, STERILE

## (undated) DEVICE — STRIP, SKIN CLOSURE, STERI STRIP, REINFORCED, 0.5 X 4 IN

## (undated) DEVICE — DRESSING, ISLAND, TELFA, 4 X 5 IN

## (undated) DEVICE — APPLICATOR, CHLORAPREP, W/ORANGE TINT, 26ML

## (undated) DEVICE — DRESSING, TRANSPARENT, TEGADERM, 4 X 4-3/4 IN

## (undated) DEVICE — APPLICATOR, PREP, CHLORAPREP, W/ORANGE TINT, 10.5ML

## (undated) DEVICE — Device

## (undated) DEVICE — SYRINGE, HYPODERMIC, LUER LOCK, 6 CC

## (undated) DEVICE — SUTURE, SILK, 2-0, TIES, 12-30 IN, BLACK

## (undated) DEVICE — SLEEVE, SURGICAL, 21.5 X 5.5 IN, LF, STERILE

## (undated) DEVICE — SUTURE, NUROLON, 0, 18 IN, CT1, DETACHABLE, MULTIPACK, BLACK

## (undated) DEVICE — PAD, GROUNDING, ELECTROSURGICAL, W/9 FT CABLE, POLYHESIVE II, ADULT, LF

## (undated) DEVICE — ADHESIVE, SKIN, MASTISOL, 2/3 CC VIAL

## (undated) DEVICE — TRAY, MINOR, SINGLE BASIN, STERILE

## (undated) DEVICE — MANIFOLD, 4 PORT NEPTUNE STANDARD

## (undated) DEVICE — CLEANER, ELECTROSURGICAL, TIP, 5 X 5 CM, LF

## (undated) DEVICE — DRAPE, INCISE, ANTIMICROBIAL, IOBAN 2, LARGE, 17 X 23 IN, DISPOSABLE, STERILE

## (undated) DEVICE — CUP, SOLUTION

## (undated) DEVICE — SYRINGE, 20 CC, LUER LOCK, MONOJECT, W/O CAP, LF

## (undated) DEVICE — DRAPE, SHEET, UTILITY, NON ABSORBENT, 18 X 26 IN, LF

## (undated) DEVICE — SPONGE, GAUZE, XRAY DECT, 16 PLY, 4 X 4, W/MASTER DMT,STERILE

## (undated) DEVICE — COUNTER, NEEDLE, FOAM BLOCK, POP-N-COUNT, W/BLADEGUARD, W/ADHESIVE 40 COUNT, RED

## (undated) DEVICE — SUTURE, POLYSORB, 0, 36 IN, GS21, VIOLET

## (undated) DEVICE — BOWL, BASIN, 32 OZ, STERILE